# Patient Record
Sex: FEMALE | Race: BLACK OR AFRICAN AMERICAN | NOT HISPANIC OR LATINO | Employment: OTHER | URBAN - METROPOLITAN AREA
[De-identification: names, ages, dates, MRNs, and addresses within clinical notes are randomized per-mention and may not be internally consistent; named-entity substitution may affect disease eponyms.]

---

## 2017-01-28 ENCOUNTER — HOSPITAL ENCOUNTER (EMERGENCY)
Facility: HOSPITAL | Age: 37
Discharge: HOME | End: 2017-01-28
Attending: EMERGENCY MEDICINE
Payer: MEDICAID

## 2017-01-28 VITALS
HEART RATE: 90 BPM | RESPIRATION RATE: 16 BRPM | DIASTOLIC BLOOD PRESSURE: 71 MMHG | OXYGEN SATURATION: 100 % | HEIGHT: 67 IN | TEMPERATURE: 99 F | SYSTOLIC BLOOD PRESSURE: 135 MMHG | BODY MASS INDEX: 31.23 KG/M2 | WEIGHT: 199 LBS

## 2017-01-28 DIAGNOSIS — K04.7 DENTAL ABSCESS: Primary | ICD-10-CM

## 2017-01-28 PROCEDURE — 99283 EMERGENCY DEPT VISIT LOW MDM: CPT

## 2017-01-28 RX ORDER — CLINDAMYCIN HYDROCHLORIDE 150 MG/1
300 CAPSULE ORAL EVERY 8 HOURS
Qty: 42 CAPSULE | Refills: 0 | Status: SHIPPED | OUTPATIENT
Start: 2017-01-28 | End: 2017-02-04

## 2017-01-28 RX ORDER — TRAMADOL HYDROCHLORIDE 50 MG/1
50 TABLET ORAL EVERY 6 HOURS PRN
Qty: 12 TABLET | Refills: 0 | Status: SHIPPED | OUTPATIENT
Start: 2017-01-28 | End: 2017-02-07

## 2017-01-28 NOTE — ED AVS SNAPSHOT
[facility] [address]  [place] [address]               Cyndie Tinsley   2017  8:44 PM   ED    Description:  Female : 1980   Department:  [facility]           Your Care was Coordinated By:     Provider Role From To    Roseline Ellington MD Attending Provider 17 --      Reason for Visit     Dental Pain           Diagnoses this Visit        Comments    Dental abscess    -  Primary       ED Disposition     ED Disposition Condition Comment    Discharge             To Do List           Follow-up Information     Follow up with dentist, see referral sheet.       These Medications        Disp Refills Start End    clindamycin (CLEOCIN) 150 MG capsule 42 capsule 0 2017    Take 2 capsules (300 mg total) by mouth every 8 (eight) hours. - Oral    tramadol (ULTRAM) 50 mg tablet 12 tablet 0 2017    Take 1 tablet (50 mg total) by mouth every 6 (six) hours as needed for Pain. - Oral      [facility]     [facility] Nurse Care Line -  Assistance  Registered nurses in the [facility] provide clinical advisement, health education, appointment booking, and other advisory services.  Call for this free service at [phone].             Medications           Message regarding Medications     Verify the changes and/or additions to your medication regime listed below are the same as discussed with your clinician today.  If any of these changes or additions are incorrect, please notify your healthcare provider.        START taking these NEW medications        Refills    clindamycin (CLEOCIN) 150 MG capsule 0    Sig: Take 2 capsules (300 mg total) by mouth every 8 (eight) hours.    Class: Print    Route: Oral    tramadol (ULTRAM) 50 mg tablet 0    Sig: Take 1 tablet (50 mg total) by mouth every 6 (six) hours as needed for Pain.    Class: Print    Route: Oral           Verify that the below list  "of medications is an accurate representation of the medications you are currently taking.  If none reported, the list may be blank. If incorrect, please contact your healthcare provider. Carry this list with you in case of emergency.           Current Medications     clindamycin (CLEOCIN) 150 MG capsule Take 2 capsules (300 mg total) by mouth every 8 (eight) hours.    naproxen (NAPROSYN) 500 MG tablet Take 1 tablet (500 mg total) by mouth 2 (two) times daily with meals.    norethindrone-ethinyl estradiol-iron (ESTROSTEP FE) 1-20(5)/1-30(7) /1mg-35mcg (9) Tab Take by mouth once daily.    senna-docusate 8.6-50 mg (PERICOLACE) 8.6-50 mg per tablet Take 1 tablet by mouth 2 (two) times daily.    tramadol (ULTRAM) 50 mg tablet Take 1 tablet (50 mg total) by mouth every 6 (six) hours as needed for Pain.           Clinical Reference Information           Your Vitals Were     BP Pulse Temp Resp Height Weight    135/71 90 98.5 °F (36.9 °C) (Oral) 16 5' 7" (1.702 m) 90.3 kg (199 lb)    Last Period SpO2 BMI          01/05/2017 100% 31.17 kg/m2        Allergies as of 1/28/2017        Reactions    Penicillins Anaphylaxis      Immunizations Administered on Date of Encounter - 1/28/2017     None      ED Micro, Lab, POCT     None      ED Imaging Orders     None        Discharge Instructions         Dental Abscess    An abscess is a pocket of pus at the tip of a tooth root in your jaw bone. It is caused by an infection at the root of the tooth. It can cause pain and swelling of the gum, cheek, or jaw. Pain may spread from the tooth to your ear or the area of your jaw on the same side. If the abscess isnt treated, it spreads to the gum near the tooth. This causes more swelling and pain. More serious infections cause your face to swell.  A dental abscess usually starts with a crack or cavity in the tooth. The pain is often made worse by drinking hot or cold fluids, or biting on hard foods.  Home care  Follow these guidelines when " "caring for yourself at home:  · Avoid hot and cold foods and drinks. Your tooth may be sensitive to changes in temperature. Dont chew on the side of the infected tooth.  · If your tooth is chipped or cracked, or if there is a large open cavity, put oil of cloves directly on the tooth to relieve pain. You can buy oil of cloves at drugstores. Some pharmacies carry an over-the-counter "toothache kit." This contains a paste that you can put on the exposed tooth to make it less sensitive.  · Put a cold pack on your jaw over the sore area to help reduce pain.  · You may use acetaminophen or ibuprofen to ease pain, unless another medicine was prescribed. Note: If you have chronic liver or kidney disease, talk with your health care provider before using these medicines. Also talk with your provider if youve had a stomach ulcer or GI bleeding.  · An antibiotic will be prescribed. Take it until finished, even if you are feeling better after a few days.  Follow-up care  Follow up as directed with your dentist or an oral surgeon. Once an infection occurs in a tooth, it will continue to be a problem until the infection is drained. This is done through surgery or a root canal. Or you may need to have your tooth pulled.  When to seek medical advice  Call your health care provider right away if any of these occur:  · Your face becomes more swollen or red  · Your eyelids become swollen  · Pain gets worse or spreads to your neck  · Fever over 100.4º F (38.0º C)  · Unusual drowsiness  · Headache or stiff neck  · Weakness or fainting  · Pus drains from the tooth  · Difficulty swallowing or breathing  © 8872-4120 [employer]. [address] [place] [address], [place] [address]. All rights reserved. This information is not intended as a substitute for professional medical care. Always follow your healthcare professional's instructions.          [facility] Sign-Up     Activating your [facility] account is as easy as 1-2-3!     1) " Visit [email or web address], select Sign Up Now, enter this activation code and your date of birth, then select Next.  [email or web address]  Expires: 3/14/2017  8:57 PM      2) Create a username and password to use when you visit [facility] in the future and select a security question in case you lose your password and select Next.    3) Enter your e-mail address and click Sign Up!    Additional Information  If you have questions, please e-mail [email or web address] or call [phone] to talk to our [facility] staff. Remember, [facility] is NOT to be used for urgent needs. For medical emergencies, dial 911.          [facility] complies with applicable Federal civil rights laws and does not discriminate on the basis of race, color, national origin, age, disability, or sex.        Language Assistance Services     ATTENTION: Language assistance services are available, free of charge. Please call [phone].      ATENCIÓN: Si habla español, tiene a cohen disposición servicios gratuitos de asistencia lingüística. Llame al [phone].     MAYELA Ý: N?u b?n nói Ti?ng Vi?t, có các d?ch v? h? tr? ngôn ng? mi?n phí dành cho b?n. G?i s? [phone].

## 2017-01-29 NOTE — DISCHARGE INSTRUCTIONS
"  Dental Abscess    An abscess is a pocket of pus at the tip of a tooth root in your jaw bone. It is caused by an infection at the root of the tooth. It can cause pain and swelling of the gum, cheek, or jaw. Pain may spread from the tooth to your ear or the area of your jaw on the same side. If the abscess isnt treated, it spreads to the gum near the tooth. This causes more swelling and pain. More serious infections cause your face to swell.  A dental abscess usually starts with a crack or cavity in the tooth. The pain is often made worse by drinking hot or cold fluids, or biting on hard foods.  Home care  Follow these guidelines when caring for yourself at home:  · Avoid hot and cold foods and drinks. Your tooth may be sensitive to changes in temperature. Dont chew on the side of the infected tooth.  · If your tooth is chipped or cracked, or if there is a large open cavity, put oil of cloves directly on the tooth to relieve pain. You can buy oil of cloves at drugstores. Some pharmacies carry an over-the-counter "toothache kit." This contains a paste that you can put on the exposed tooth to make it less sensitive.  · Put a cold pack on your jaw over the sore area to help reduce pain.  · You may use acetaminophen or ibuprofen to ease pain, unless another medicine was prescribed. Note: If you have chronic liver or kidney disease, talk with your health care provider before using these medicines. Also talk with your provider if youve had a stomach ulcer or GI bleeding.  · An antibiotic will be prescribed. Take it until finished, even if you are feeling better after a few days.  Follow-up care  Follow up as directed with your dentist or an oral surgeon. Once an infection occurs in a tooth, it will continue to be a problem until the infection is drained. This is done through surgery or a root canal. Or you may need to have your tooth pulled.  When to seek medical advice  Call your health care provider right away if any " of these occur:  · Your face becomes more swollen or red  · Your eyelids become swollen  · Pain gets worse or spreads to your neck  · Fever over 100.4º F (38.0º C)  · Unusual drowsiness  · Headache or stiff neck  · Weakness or fainting  · Pus drains from the tooth  · Difficulty swallowing or breathing  © 4882-8002 [employer]. [address] [place] [address], [place] [address]. All rights reserved. This information is not intended as a substitute for professional medical care. Always follow your healthcare professional's instructions.

## 2017-01-29 NOTE — ED PROVIDER NOTES
Encounter Date: 1/28/2017       History     Chief Complaint   Patient presents with    Dental Pain     started earlier today     Review of patient's allergies indicates:   Allergen Reactions    Penicillins Anaphylaxis     The history is provided by the patient.    patient resents emergency department with left lower tooth pain tonight.  No swelling to her face.  No fevers.  Past Medical History   Diagnosis Date    Edema     Hypertension      No past medical history pertinent negatives.  Past Surgical History   Procedure Laterality Date    Eye surgery      Breast lumpectomy       History reviewed. No pertinent family history.  Social History   Substance Use Topics    Smoking status: Never Smoker    Smokeless tobacco: Never Used    Alcohol use No     Review of Systems   Constitutional: Negative for fever.   HENT: Negative for sore throat.    Respiratory: Negative for shortness of breath.    Cardiovascular: Negative for chest pain.   Gastrointestinal: Negative for nausea.   Genitourinary: Negative for dysuria.   Musculoskeletal: Negative for back pain.   Skin: Negative for rash.   Neurological: Negative for weakness.   Hematological: Does not bruise/bleed easily.       Physical Exam   Initial Vitals   BP Pulse Resp Temp SpO2   01/28/17 2041 01/28/17 2041 01/28/17 2041 01/28/17 2041 01/28/17 2041   135/71 90 16 98.5 °F (36.9 °C) 100 %     Physical Exam    Nursing note and vitals reviewed.  Constitutional: She appears well-developed and well-nourished.   HENT:   Head: Normocephalic and atraumatic.   Mouth/Throat: Oropharynx is clear and moist.       No gingival abscesses   Neck: Normal range of motion. Neck supple.   Musculoskeletal: She exhibits no edema or tenderness.   Lymphadenopathy:     She has no cervical adenopathy.   Neurological: She is alert and oriented to person, place, and time.   Skin: Skin is warm and dry.   Psychiatric: She has a normal mood and affect. Her behavior is normal. Judgment and  thought content normal.         ED Course   Procedures  Labs Reviewed - No data to display          Medical Decision Making:   ED Management:  Patient with dental abscess.  She'll be started on clindamycin and Ultram for pain and she can be discharged and follow-up with a dentist.                   ED Course     Clinical Impression:   The encounter diagnosis was Dental abscess.          Roseline Ellington MD  01/28/17 3030

## 2017-03-08 ENCOUNTER — HOSPITAL ENCOUNTER (EMERGENCY)
Facility: HOSPITAL | Age: 37
Discharge: HOME | End: 2017-03-09
Attending: EMERGENCY MEDICINE
Payer: MEDICAID

## 2017-03-08 DIAGNOSIS — R60.9 FLUID RETENTION: Primary | ICD-10-CM

## 2017-03-08 PROCEDURE — 99283 EMERGENCY DEPT VISIT LOW MDM: CPT

## 2017-03-08 NOTE — ED AVS SNAPSHOT
[facility] [address]  [place] [address]               Cyndie Tinsley   3/8/2017 11:31 PM   ED    Description:  Female : 1980   Department:  [facility]           Your Care was Coordinated By:     Provider Role From To    Stella De La Garza MD Attending Provider [phone] --      Reason for Visit     Dizziness           Diagnoses this Visit        Comments    Fluid retention    -  Primary       ED Disposition     None           To Do List           Follow-up Information     Follow up with your doctor.    Why:  As needed      [facility]     [facility] Nurse Care Line -  Assistance  Registered nurses in the [facility] provide clinical advisement, health education, appointment booking, and other advisory services.  Call for this free service at [phone].             Medications           Message regarding Medications     Verify the changes and/or additions to your medication regime listed below are the same as discussed with your clinician today.  If any of these changes or additions are incorrect, please notify your healthcare provider.        STOP taking these medications     senna-docusate 8.6-50 mg (PERICOLACE) 8.6-50 mg per tablet Take 1 tablet by mouth 2 (two) times daily.    naproxen (NAPROSYN) 500 MG tablet Take 1 tablet (500 mg total) by mouth 2 (two) times daily with meals.           Verify that the below list of medications is an accurate representation of the medications you are currently taking.  If none reported, the list may be blank. If incorrect, please contact your healthcare provider. Carry this list with you in case of emergency.           Current Medications     norethindrone-ethinyl estradiol-iron (ESTROSTEP FE) 1-20(5)/1-30(7) /1mg-35mcg (9) Tab Take by mouth once daily.           Clinical Reference Information           Your Vitals Were     BP Pulse Temp Resp Height Weight    130/80 (BP  "Location: Left arm, Patient Position: Sitting) 86 97.9 °F (36.6 °C) (Oral) 16 5' 7" (1.702 m) 90.7 kg (200 lb)    SpO2 BMI             100% 31.32 kg/m2         Allergies as of 3/9/2017        Reactions    Penicillins Anaphylaxis      Immunizations Administered on Date of Encounter - 3/9/2017     None      ED Micro, Lab, POCT     None      ED Imaging Orders     None        Discharge Instructions       Avoid salt and salty foods.  Drink plenty of water.        Discharge References/Attachments     LOW-SALT CHOICES (ENGLISH)      [facility] Sign-Up     Activating your [facility] account is as easy as 1-2-3!     1) Visit [email or web address], select Sign Up Now, enter this activation code and your date of birth, then select Next.  [email or web address]  Expires: 3/14/2017  8:57 PM      2) Create a username and password to use when you visit [facility] in the future and select a security question in case you lose your password and select Next.    3) Enter your e-mail address and click Sign Up!    Additional Information  If you have questions, please e-mail [email or web address] or call [phone] to talk to our [facility] staff. Remember, [facility] is NOT to be used for urgent needs. For medical emergencies, dial 911.          [facility] complies with applicable Federal civil rights laws and does not discriminate on the basis of race, color, national origin, age, disability, or sex.        Language Assistance Services     ATTENTION: Language assistance services are available, free of charge. Please call [phone].      ATENCIÓN: Si habla español, tiene a cohen disposición servicios gratuitos de asistencia lingüística. Llame al [phone].     CHÚ Ý: N?u b?n nói Ti?ng Vi?t, có các d?ch v? h? tr? ngôn ng? mi?n phí dành cho b?n. G?i s? [phone].        "

## 2017-03-09 VITALS
HEART RATE: 82 BPM | WEIGHT: 200 LBS | RESPIRATION RATE: 18 BRPM | BODY MASS INDEX: 31.39 KG/M2 | DIASTOLIC BLOOD PRESSURE: 68 MMHG | TEMPERATURE: 98 F | HEIGHT: 67 IN | OXYGEN SATURATION: 98 % | SYSTOLIC BLOOD PRESSURE: 117 MMHG

## 2017-03-09 NOTE — ED NOTES
Pt discharged ambulatory and instructions given; pt is ambulatory and stable and in no distress; no dizziness at discharge

## 2017-03-09 NOTE — ED PROVIDER NOTES
Encounter Date: 3/8/2017       History     Chief Complaint   Patient presents with    Dizziness     Ate seafood earlier and is now feeling dizzy.  Denies chest pain, and shortness of breath     Review of patient's allergies indicates:   Allergen Reactions    Penicillins Anaphylaxis     HPI Comments: 37F presents with dizziness.  She explains it as she ate boiled seafood tonight and her body filled with fluid.  She states whenever she eats seafood her body feels with fluid.  She feels fluid in her head too.  She is not supposed to eat salt.  She is requesting a fluid pill to get rid of the fluid.  No associated trouble swallowing or SOB.  No other complaints.    The history is provided by the patient.     Past Medical History:   Diagnosis Date    Edema     Hypertension      Past Surgical History:   Procedure Laterality Date    BREAST LUMPECTOMY      EYE SURGERY       History reviewed. No pertinent family history.  Social History   Substance Use Topics    Smoking status: Never Smoker    Smokeless tobacco: Never Used    Alcohol use No     Review of Systems   HENT: Negative for trouble swallowing.    Respiratory: Negative for shortness of breath.    Neurological: Negative for dizziness.   All other systems reviewed and are negative.      Physical Exam   Initial Vitals   BP Pulse Resp Temp SpO2   03/08/17 2312 03/08/17 2312 03/08/17 2312 03/08/17 2312 03/08/17 2312   130/80 86 16 97.9 °F (36.6 °C) 100 %     Physical Exam    Nursing note and vitals reviewed.  Constitutional: She appears well-developed and well-nourished. No distress.   HENT:   Head: Normocephalic and atraumatic.   Eyes: Conjunctivae are normal.   Neck: Normal range of motion.   Cardiovascular: Normal rate, regular rhythm and normal heart sounds.   No murmur heard.  Pulmonary/Chest: Breath sounds normal. No stridor. She has no wheezes. She has no rhonchi. She has no rales.   Musculoskeletal: Normal range of motion. She exhibits no edema or  tenderness.   Neurological: She is alert and oriented to person, place, and time.   Skin: Skin is warm and dry.   Psychiatric: She has a normal mood and affect. Her behavior is normal.         ED Course   Procedures  Labs Reviewed - No data to display          Medical Decision Making:   ED Management:  Edema after eating boiled seafood - I see no edema on exam.  No wheezing, no signs of allergic reaction and no anasarca.  I advised pt to avoid salt, including boiled seafood and increase water intake.  I am not prescribing diuretic.                   ED Course     Clinical Impression:   The encounter diagnosis was Fluid retention.          Stella De La Garza MD  03/09/17 0018

## 2017-05-26 ENCOUNTER — HOSPITAL ENCOUNTER (EMERGENCY)
Facility: HOSPITAL | Age: 37
Discharge: HOME | End: 2017-05-26
Attending: EMERGENCY MEDICINE
Payer: MEDICAID

## 2017-05-26 VITALS
DIASTOLIC BLOOD PRESSURE: 83 MMHG | BODY MASS INDEX: 29.82 KG/M2 | RESPIRATION RATE: 18 BRPM | TEMPERATURE: 99 F | OXYGEN SATURATION: 99 % | HEART RATE: 83 BPM | HEIGHT: 67 IN | WEIGHT: 190 LBS | SYSTOLIC BLOOD PRESSURE: 126 MMHG

## 2017-05-26 DIAGNOSIS — R10.9 ABDOMINAL PAIN: ICD-10-CM

## 2017-05-26 DIAGNOSIS — D25.9 UTERINE LEIOMYOMA, UNSPECIFIED LOCATION: ICD-10-CM

## 2017-05-26 DIAGNOSIS — R10.2 SUPRAPUBIC PAIN: Primary | ICD-10-CM

## 2017-05-26 DIAGNOSIS — R10.2 PELVIC PAIN IN FEMALE: ICD-10-CM

## 2017-05-26 LAB
ALBUMIN SERPL BCP-MCNC: 3.7 G/DL
ALP SERPL-CCNC: 69 U/L
ALT SERPL W/O P-5'-P-CCNC: 12 U/L
ANION GAP SERPL CALC-SCNC: 10 MMOL/L
AST SERPL-CCNC: 18 U/L
B-HCG UR QL: NEGATIVE
BACTERIA #/AREA URNS HPF: ABNORMAL /HPF
BACTERIA GENITAL QL WET PREP: ABNORMAL
BASOPHILS # BLD AUTO: 0.03 K/UL
BASOPHILS NFR BLD: 0.4 %
BILIRUB SERPL-MCNC: 0.3 MG/DL
BILIRUB UR QL STRIP: NEGATIVE
BUN SERPL-MCNC: 7 MG/DL
CALCIUM SERPL-MCNC: 9.7 MG/DL
CHLORIDE SERPL-SCNC: 108 MMOL/L
CLARITY UR: ABNORMAL
CLUE CELLS VAG QL WET PREP: ABNORMAL
CO2 SERPL-SCNC: 23 MMOL/L
COLOR UR: YELLOW
CREAT SERPL-MCNC: 0.9 MG/DL
CTP QC/QA: YES
DIFFERENTIAL METHOD: ABNORMAL
EOSINOPHIL # BLD AUTO: 0.2 K/UL
EOSINOPHIL NFR BLD: 2 %
ERYTHROCYTE [DISTWIDTH] IN BLOOD BY AUTOMATED COUNT: 14.3 %
EST. GFR  (AFRICAN AMERICAN): >60 ML/MIN/1.73 M^2
EST. GFR  (NON AFRICAN AMERICAN): >60 ML/MIN/1.73 M^2
FILAMENT FUNGI VAG WET PREP-#/AREA: ABNORMAL
GLUCOSE SERPL-MCNC: 79 MG/DL
GLUCOSE UR QL STRIP: NEGATIVE
HCT VFR BLD AUTO: 36.5 %
HGB BLD-MCNC: 11.9 G/DL
HGB UR QL STRIP: ABNORMAL
KETONES UR QL STRIP: NEGATIVE
LEUKOCYTE ESTERASE UR QL STRIP: NEGATIVE
LIPASE SERPL-CCNC: 46 U/L
LYMPHOCYTES # BLD AUTO: 2.5 K/UL
LYMPHOCYTES NFR BLD: 30.3 %
MCH RBC QN AUTO: 28.7 PG
MCHC RBC AUTO-ENTMCNC: 32.6 %
MCV RBC AUTO: 88 FL
MICROSCOPIC COMMENT: ABNORMAL
MONOCYTES # BLD AUTO: 0.5 K/UL
MONOCYTES NFR BLD: 5.7 %
NEUTROPHILS # BLD AUTO: 5 K/UL
NEUTROPHILS NFR BLD: 61.5 %
NITRITE UR QL STRIP: NEGATIVE
PH UR STRIP: 6 [PH] (ref 5–8)
PLATELET # BLD AUTO: 364 K/UL
PMV BLD AUTO: 9.7 FL
POTASSIUM SERPL-SCNC: 3.9 MMOL/L
PROT SERPL-MCNC: 7.4 G/DL
PROT UR QL STRIP: NEGATIVE
RBC # BLD AUTO: 4.15 M/UL
RBC #/AREA URNS HPF: 5 /HPF (ref 0–4)
SODIUM SERPL-SCNC: 141 MMOL/L
SP GR UR STRIP: >=1.03 (ref 1–1.03)
SPECIMEN SOURCE: ABNORMAL
T VAGINALIS GENITAL QL WET PREP: ABNORMAL
URN SPEC COLLECT METH UR: ABNORMAL
UROBILINOGEN UR STRIP-ACNC: NEGATIVE EU/DL
WBC # BLD AUTO: 8.11 K/UL
WBC #/AREA URNS HPF: 3 /HPF (ref 0–5)
WBC #/AREA VAG WET PREP: ABNORMAL
YEAST GENITAL QL WET PREP: ABNORMAL

## 2017-05-26 PROCEDURE — 99285 EMERGENCY DEPT VISIT HI MDM: CPT | Mod: 25

## 2017-05-26 PROCEDURE — 85025 COMPLETE CBC W/AUTO DIFF WBC: CPT

## 2017-05-26 PROCEDURE — 83690 ASSAY OF LIPASE: CPT

## 2017-05-26 PROCEDURE — 96361 HYDRATE IV INFUSION ADD-ON: CPT

## 2017-05-26 PROCEDURE — 25000003 PHARM REV CODE 250: Performed by: NURSE PRACTITIONER

## 2017-05-26 PROCEDURE — 87210 SMEAR WET MOUNT SALINE/INK: CPT

## 2017-05-26 PROCEDURE — 81025 URINE PREGNANCY TEST: CPT | Performed by: NURSE PRACTITIONER

## 2017-05-26 PROCEDURE — 81000 URINALYSIS NONAUTO W/SCOPE: CPT

## 2017-05-26 PROCEDURE — 87591 N.GONORRHOEAE DNA AMP PROB: CPT

## 2017-05-26 PROCEDURE — 63600175 PHARM REV CODE 636 W HCPCS: Performed by: NURSE PRACTITIONER

## 2017-05-26 PROCEDURE — 96374 THER/PROPH/DIAG INJ IV PUSH: CPT

## 2017-05-26 PROCEDURE — 80053 COMPREHEN METABOLIC PANEL: CPT

## 2017-05-26 RX ORDER — KETOROLAC TROMETHAMINE 30 MG/ML
15 INJECTION, SOLUTION INTRAMUSCULAR; INTRAVENOUS
Status: COMPLETED | OUTPATIENT
Start: 2017-05-26 | End: 2017-05-26

## 2017-05-26 RX ORDER — NAPROXEN 500 MG/1
500 TABLET ORAL 2 TIMES DAILY WITH MEALS
Qty: 10 TABLET | Refills: 0 | Status: SHIPPED | OUTPATIENT
Start: 2017-05-26 | End: 2017-10-29

## 2017-05-26 RX ADMIN — KETOROLAC TROMETHAMINE 15 MG: 30 INJECTION, SOLUTION INTRAMUSCULAR at 07:05

## 2017-05-26 RX ADMIN — SODIUM CHLORIDE 1000 ML: 0.9 INJECTION, SOLUTION INTRAVENOUS at 06:05

## 2017-05-26 NOTE — ED PROVIDER NOTES
"Encounter Date: 5/26/2017       History     Chief Complaint   Patient presents with    Abdominal Pain     accompanied by nausea; denies accompanying symtpoms; began on Monday; lower pelvic pain; denies urinary symptoms     Review of patient's allergies indicates:   Allergen Reactions    Penicillins Anaphylaxis     37-year-old female with a past medical history of ectopic pregnancy presents today with lower abdominal pain.  Patient states the pain started on Monday, and has gradually gotten worse.  Patient denies trauma, fever/chills, nausea, vomiting, diarrhea, constipation, dysuria, hematuria, vaginal discharge him and rash.  Patient states upon providing a urine specimen in the ED, she has noticed spotting on the toilet tissue after wiping.  Patient has tried taking multiple medications for her pain, including old prescription Vicodin.  Nothing makes the pain better or worse.  Patient states that she is able to eat and drink as normal.  Patient states the pain feels like cramping.  Last bowel movement today which was normal.  Pt is sexually active with inconsistent condom use.  LMP was beginning of April.  Pt reports PMHx of syphilis, which was treated >5 years ago.       The history is provided by the patient.   Abdominal Pain   The current episode started several days ago. The onset of the illness was gradual. The problem has not changed since onset.The abdominal pain is located in the suprapubic region. The abdominal pain does not radiate. The severity of the abdominal pain is 9/10. The abdominal pain is relieved by nothing. The other symptoms of the illness include nausea and vaginal bleeding (pt just noticed some "spotting" while providing UA sample in ED.). The other symptoms of the illness do not include fever, fatigue, shortness of breath, vomiting, diarrhea, dysuria, hematemesis, hematochezia or vaginal discharge.   Nausea began 2 days ago. The nausea is associated with eating. The nausea is exacerbated " "by food.   Vaginal bleeding was first noticed today. Vaginal bleeding is unchanged since it began. Vaginal bleeding occurred 1 time. Blood was noticed on tissue. The quantity of blood was equivalent to spotting.   The patient has not had a change in bowel habit. Symptoms associated with the illness do not include chills, anorexia, diaphoresis, heartburn, constipation, urgency, hematuria, frequency or back pain. Significant associated medical issues do not include GERD or diabetes.     Past Medical History:   Diagnosis Date    Edema     Hypertension      Past Surgical History:   Procedure Laterality Date    BREAST LUMPECTOMY      EYE SURGERY       History reviewed. No pertinent family history.  Social History   Substance Use Topics    Smoking status: Never Smoker    Smokeless tobacco: Never Used    Alcohol use No     Review of Systems   Constitutional: Negative for chills, diaphoresis, fatigue and fever.   HENT: Negative for congestion and rhinorrhea.    Respiratory: Negative for cough and shortness of breath.    Cardiovascular: Negative for chest pain.   Gastrointestinal: Positive for abdominal pain and nausea. Negative for anorexia, blood in stool, constipation, diarrhea, heartburn, hematemesis, hematochezia and vomiting.   Endocrine: Negative for polyuria.   Genitourinary: Positive for vaginal bleeding (pt just noticed some "spotting" while providing UA sample in ED.). Negative for dysuria, frequency, hematuria, urgency and vaginal discharge.   Musculoskeletal: Negative for back pain.   Skin: Negative for rash.   Allergic/Immunologic: Negative for immunocompromised state.   Neurological: Negative for headaches.   Hematological: Does not bruise/bleed easily.   Psychiatric/Behavioral: Negative for confusion.       Physical Exam     Initial Vitals [05/26/17 1651]   BP Pulse Resp Temp SpO2   137/79 98 14 98.5 °F (36.9 °C) 99 %     Physical Exam    Nursing note and vitals reviewed.  Constitutional: Vital signs " are normal. She appears well-developed and well-nourished. She is active and cooperative. She is easily aroused.  Non-toxic appearance. She does not have a sickly appearance. She does not appear ill. No distress.   HENT:   Head: Normocephalic and atraumatic.   Mouth/Throat: Uvula is midline and oropharynx is clear and moist.   Eyes: Conjunctivae and EOM are normal.   Neck: Normal range of motion. Neck supple.   Cardiovascular: Normal rate, regular rhythm and normal heart sounds.   Pulmonary/Chest: Effort normal and breath sounds normal.   Abdominal: Soft. Normal appearance and bowel sounds are normal. She exhibits distension (mild). There is tenderness in the suprapubic area. There is no rigidity, no rebound, no guarding and no CVA tenderness.   Genitourinary: Vagina normal and uterus normal. Pelvic exam was performed with patient supine. No labial fusion. There is no rash, tenderness, lesion or injury on the right labia. There is no rash, tenderness, lesion or injury on the left labia. Cervix exhibits no motion tenderness, no discharge and no friability. Right adnexum displays no mass, no tenderness and no fullness. Left adnexum displays no mass, no tenderness and no fullness.   Genitourinary Comments: Witnessed by MANI Mccarthy   Lymphadenopathy:        Right: No inguinal adenopathy present.        Left: No inguinal adenopathy present.   Neurological: She is alert, oriented to person, place, and time and easily aroused. She has normal strength. GCS eye subscore is 4. GCS verbal subscore is 5. GCS motor subscore is 6.   Skin: Skin is warm, dry and intact. No abrasion, no bruising and no rash noted.   Psychiatric: She has a normal mood and affect. Her speech is normal and behavior is normal. Judgment and thought content normal. Cognition and memory are normal.         ED Course   Procedures  Labs Reviewed   URINALYSIS - Abnormal; Notable for the following:        Result Value    Appearance, UA Hazy (*)     Specific  Gravity, UA >=1.030 (*)     Occult Blood UA 2+ (*)     All other components within normal limits   VAGINAL SCREEN - Abnormal; Notable for the following:     WBC - Vaginal Screen Rare (*)     Bacteria - Vaginal Screen Few (*)     All other components within normal limits   URINALYSIS MICROSCOPIC - Abnormal; Notable for the following:     RBC, UA 5 (*)     All other components within normal limits   CBC W/ AUTO DIFFERENTIAL - Abnormal; Notable for the following:     Hemoglobin 11.9 (*)     Hematocrit 36.5 (*)     Platelets 364 (*)     All other components within normal limits   POCT URINE PREGNANCY - Normal   C. TRACHOMATIS/N. GONORRHOEAE BY AMP DNA   COMPREHENSIVE METABOLIC PANEL   LIPASE         Imaging Results          US Pelvis Comp with Transvag NON-OB (xpd) (Final result)  Result time 05/26/17 18:44:48   Procedure changed from US Pelvis Limited Non OB     Final result by Delfino Ceballos MD (05/26/17 18:44:48)                 Impression:        *  Uterine fibroid. No acute process.        Electronically signed by: DELFINO CEBALLOS MD  Date:     05/26/17  Time:    18:44              Narrative:    Time of Procedure: 05/26/17 18:18:30  Accession # [id number]    Reason for study: Pain.     Comparison: 8/30/16.    Technique: Pelvic ultrasound was performed using transvaginal and transabdominal approaches.    Findings: The uterus measures 8.7 x 0.7 x 5.0 cm. Uterine parenchyma demonstrates a subserosal fibroid in the posterior uterine body measuring 3.5 x 3.4 x 3.2 cm.. The endometrial stripe is normal in thickness and measures 0.5 cm.    The right ovary is normal in size and measures 2.7 x 1.4 x 3.2 cm. The left ovary is normal in size and measures 2.7 x 1.0 x 2.1 cm. Follicles are seen in both ovaries. Arterial and venous flow are preserved bilaterally with resistive indices of 0.51 on the right and 0.61 on the left. No free fluid is seen.                             X-Ray Abdomen Flat And Erect (Final result)   Result time 05/26/17 18:19:21    Final result by Corey Capps MD (05/26/17 18:19:21)                 Impression:        No evidence of bowel obstruction or perforation.      Electronically signed by: Dr. Corey Capps MD  Date:     05/26/17  Time:    18:19              Narrative:    SUPINE AND UPRIGHT ABDOMEN:      Comparison: None.    Findings:     The bowel gas pattern is non-obstructive. No evidence of free air.   No airspace consolidation at the lung bases.   No acute bony abnormality.                                   Medical Decision Making:   Initial Assessment:   37-year-old female presents today to the ER for evaluation of suprapubic and abdominal pain.  Pain started on Monday and has progressively gotten worse. Patient last ate a cheeseburger before coming to the ER.  She denies fever/chills, urinary symptoms, nausea/vomiting/diarrhea, vaginal bleeding, vaginal discharge, and rash. Pt noticed spotting after wiping in the ED.  LMP was in April.  Patient appears well, nontoxic.  Vital stable.  Abdomen soft with suprapubic tenderness to palpation. No CVA tenderness.  No r/r/g.  Mild distention.  Bowel sounds normoactive.  No signs of trauma.   Differential Diagnosis:   UTI, pregnancy, Ileus, constipation, dehydration, electrolyte derangement, DUB  Clinical Tests:   Lab Tests: Ordered and Reviewed  Radiological Study: Ordered and Reviewed  ED Management:  Labs, IV fluids, US pelvis, Abd/pelvis xray  US reveals fibroid, no other acute changes. UA negative for infection.  Xray without acute changes. Labs unremarkable.  Advised f/u with GYN within 3 days and return to the ED if condition changes, progresses, or if there are concerns.  Pt verbalized understanding, compliance, and agreement with treatment plan.  RX naprosyn.               Attending Attestation:     Physician Attestation Statement for NP/PA:   I discussed this assessment and plan of this patient with the NP/PA, but I did not personally  examine the patient. The face to face encounter was performed by the NP/PA.                  ED Course     Clinical Impression:   The primary encounter diagnosis was Suprapubic pain. Diagnoses of Abdominal pain, Pelvic pain in female, and Uterine leiomyoma, unspecified location were also pertinent to this visit.    Disposition:   Disposition: Discharged  Condition: Stable       СВЕТЛАНА Cameron  05/26/17 1905       Bernice Pisano MD  05/26/17 1939

## 2017-05-26 NOTE — ED TRIAGE NOTES
Pt reports lower abdominal pain with nausea since Monday.  Denies burning upon urination but states has been getting up to pee a lot at night for the past month.  Denies DM, does not check CBG at home.

## 2017-05-27 LAB
C TRACH DNA SPEC QL NAA+PROBE: NOT DETECTED
N GONORRHOEA DNA SPEC QL NAA+PROBE: NOT DETECTED

## 2017-07-18 PROCEDURE — 96372 THER/PROPH/DIAG INJ SC/IM: CPT

## 2017-07-18 PROCEDURE — 99283 EMERGENCY DEPT VISIT LOW MDM: CPT | Mod: 25

## 2017-07-19 ENCOUNTER — HOSPITAL ENCOUNTER (EMERGENCY)
Facility: HOSPITAL | Age: 37
Discharge: HOME | End: 2017-07-19
Attending: EMERGENCY MEDICINE
Payer: MEDICAID

## 2017-07-19 VITALS
DIASTOLIC BLOOD PRESSURE: 86 MMHG | RESPIRATION RATE: 18 BRPM | HEART RATE: 80 BPM | WEIGHT: 195 LBS | OXYGEN SATURATION: 98 % | HEIGHT: 67 IN | TEMPERATURE: 99 F | BODY MASS INDEX: 30.61 KG/M2 | SYSTOLIC BLOOD PRESSURE: 119 MMHG

## 2017-07-19 DIAGNOSIS — K02.9 DENTAL CARIES: Primary | ICD-10-CM

## 2017-07-19 PROCEDURE — 63600175 PHARM REV CODE 636 W HCPCS: Performed by: EMERGENCY MEDICINE

## 2017-07-19 RX ORDER — KETOROLAC TROMETHAMINE 30 MG/ML
60 INJECTION, SOLUTION INTRAMUSCULAR; INTRAVENOUS
Status: COMPLETED | OUTPATIENT
Start: 2017-07-19 | End: 2017-07-19

## 2017-07-19 RX ORDER — NAPROXEN 500 MG/1
500 TABLET ORAL 2 TIMES DAILY PRN
Qty: 30 TABLET | Refills: 0 | Status: SHIPPED | OUTPATIENT
Start: 2017-07-19 | End: 2017-10-29

## 2017-07-19 RX ORDER — CLINDAMYCIN HYDROCHLORIDE 150 MG/1
300 CAPSULE ORAL 4 TIMES DAILY
Qty: 40 CAPSULE | Refills: 0 | Status: SHIPPED | OUTPATIENT
Start: 2017-07-19 | End: 2017-07-29

## 2017-07-19 RX ORDER — HYDROCODONE BITARTRATE AND ACETAMINOPHEN 5; 325 MG/1; MG/1
1 TABLET ORAL EVERY 4 HOURS PRN
Qty: 12 TABLET | Refills: 0 | Status: SHIPPED | OUTPATIENT
Start: 2017-07-19 | End: 2017-10-29

## 2017-07-19 RX ADMIN — KETOROLAC TROMETHAMINE 60 MG: 30 INJECTION, SOLUTION INTRAMUSCULAR at 03:07

## 2017-07-19 NOTE — ED PROVIDER NOTES
Encounter Date: 7/18/2017       History     Chief Complaint   Patient presents with    Oral Pain     pt. reports upper and lower rt. sided gum pain x1 day.      CHIEF COMPLAINT: right lower toothache    HISTORY OF PRESENT ILLNESS: his is a 37-year-old female presents to the emergency department with right lower toothache.  She forces been there for couple of days.  Tonight she just couldn't stand the pain any longer.  No difficulty swallowing.  No difficulty breathing, no fever, chills or sweats.  No significant amount of swelling noted to the jaw.  No other complaints.    REVIEW OF SYSTEMS:  Constitutional: No fever, no chills.  Eyes: No discharge. No pain.  HENT: No nasal drainage. No ear ache. No sore throat.  Cardiovascular: No chest pain, no palpitations.  Respiratory: No cough, no shortness of breath.  Gastrointestinal: No abdominal pain, no vomiting. No diarrhea.  Genitourinary: No hematuria, dysuria, urgency.  Musculoskeletal: No back pain.  Skin: No rashes, no lesions.  Neurological: No headache, no focal weakness.    ALLERGIES reviewed  Family history reviewed  Home medications reviewed  Problem list reviewed    The history is provided by the patient           Review of patient's allergies indicates:   Allergen Reactions    Penicillins Anaphylaxis     Past Medical History:   Diagnosis Date    Edema     Hypertension      Past Surgical History:   Procedure Laterality Date    BREAST LUMPECTOMY      EYE SURGERY       No family history on file.  Social History   Substance Use Topics    Smoking status: Never Smoker    Smokeless tobacco: Never Used    Alcohol use No     Review of Systems   All other systems reviewed and are negative.      Physical Exam     Initial Vitals [07/19/17 0011]   BP Pulse Resp Temp SpO2   123/89 96 20 98.5 °F (36.9 °C) 97 %      MAP       100.33         Physical Exam    Nursing note and vitals reviewed.  Constitutional: She appears well-developed and well-nourished.   HENT:    Head: Normocephalic and atraumatic.   Nose: Nose normal.   Mouth/Throat: Oropharynx is clear and moist.       No significant amount of jaw swelling.   Eyes: Conjunctivae and EOM are normal. Pupils are equal, round, and reactive to light. No scleral icterus.   Neck: Normal range of motion. Neck supple. No JVD present.   Cardiovascular: Normal rate, regular rhythm, normal heart sounds and intact distal pulses. Exam reveals no gallop and no friction rub.    No murmur heard.  Pulmonary/Chest: Breath sounds normal. No stridor. No respiratory distress. She has no wheezes. She exhibits no tenderness.   Abdominal: Soft. Bowel sounds are normal. She exhibits no distension and no mass. There is no tenderness. There is no rebound and no guarding.   Musculoskeletal: Normal range of motion. She exhibits no edema or tenderness.   Back is nontender to palpation.    Neurological: She is alert and oriented to person, place, and time. She has normal strength. No cranial nerve deficit.   Skin: Skin is warm and dry. No rash noted. No pallor.   Psychiatric: She has a normal mood and affect. Thought content normal.         ED Course   Procedures  Labs Reviewed - No data to display          Medical Decision Making:   Differential Diagnosis:   Dental caries, abscess  ED Management:  This is a 37-year-old female presents to the emergency Department today with pain and toothache.  She has significant amount of dental caries in that area. She has no swelling noted to the jaw.  No difficulty breathing.  No signs of any emergent abscess that would need drainage today.  I will place her on symptom control clindamycin for antibiotics and have her follow-up with dentist for further management.  The patient is comfortable with this plan and comfortable going home at this time. After taking into careful account the historical factors and physical exam findings of the patient's presentation today no acute emergent medical condition has been  identified. The patient appears to be low risk for an emergent medical condition and I feel it is safe and appropriate at this time for the patient to be discharged to follow-up as detailed in their discharge instructions for reevaluation and possible continued outpatient workup and management. I have discussed the specifics of the workup with the patient and the patient has verbalized understanding of the details of the workup, the diagnosis, the treatment plan, and the need for outpatient follow-up.  Although the patient has no emergent etiology today this does not preclude the development of an emergent condition so in addition, I have advised the patient that they can return to the ED and/or activate EMS at any time with worsening of their symptoms, change of their symptoms, or with any other medical complaint.  The patient remained comfortable and stable during their visit in the ED.  Discharge and follow-up instructions discussed with the patient who expressed understanding and willingness to comply with my recommendations.     This medical record was prepared using voice dictation software. There may be phonetic errors.                   ED Course     Clinical Impression:   The encounter diagnosis was Dental caries.                           Larissa Klein MD  07/19/17 0301

## 2017-07-19 NOTE — ED NOTES
"Pt requests to not wait the 15 minute shot time. Pt states "My kids are at home by themselves."   "

## 2017-07-19 NOTE — ED NOTES
Pt presents to ED c/o right sided oral pain. Pt reports the pain began this morning. Pt denies seeing a dentist for this problem. Subjective fever, reports chills throughout the day. Reports has been taking Goody's for pain throughout the day.

## 2017-07-19 NOTE — DISCHARGE INSTRUCTIONS
You'll need to see a dentist for definitive care of your dental cavities.  Take your medications as prescribed.  Refer to following resources for dental services. Return to the emergency department if you have increased swelling, your unable to open your jaw, you've difficulty swallowing or breathing or any other concerns. Refer to the additional material provided for further information including when to return to the emergency department.    Dental resources:     [facility]  [phone]    [facility]  8-4p Monday through Friday  [phone]    [facility]  [phone]    [facility] pediatric [facility]  0-6 years [phone]  7013 years [phone]    [facility]   Donated dental services for developmental disability care  [phone]    [facility] dental services  [phone]    [facility]  [address] [place] [address], Monday through Friday except on Wednesdays 8 - 4 PM  [phone]  Over 60 years old and living in [place]  [phone]    [facility]  [address] [facility]  [phone]    [facility] for [facility] patients  [address]  [phone]    [facility]  [phone]

## 2017-10-28 ENCOUNTER — HOSPITAL ENCOUNTER (EMERGENCY)
Facility: HOSPITAL | Age: 37
Discharge: HOME | End: 2017-10-29
Attending: EMERGENCY MEDICINE
Payer: MEDICAID

## 2017-10-28 DIAGNOSIS — S20.229A CONTUSION OF BACK, UNSPECIFIED LATERALITY, INITIAL ENCOUNTER: Primary | ICD-10-CM

## 2017-10-28 PROCEDURE — 99284 EMERGENCY DEPT VISIT MOD MDM: CPT | Mod: 25

## 2017-10-28 PROCEDURE — 81025 URINE PREGNANCY TEST: CPT | Performed by: EMERGENCY MEDICINE

## 2017-10-28 PROCEDURE — 96372 THER/PROPH/DIAG INJ SC/IM: CPT

## 2017-10-28 RX ORDER — KETOROLAC TROMETHAMINE 30 MG/ML
30 INJECTION, SOLUTION INTRAMUSCULAR; INTRAVENOUS
Status: COMPLETED | OUTPATIENT
Start: 2017-10-28 | End: 2017-10-29

## 2017-10-29 VITALS
SYSTOLIC BLOOD PRESSURE: 113 MMHG | BODY MASS INDEX: 29.82 KG/M2 | HEIGHT: 67 IN | HEART RATE: 87 BPM | RESPIRATION RATE: 16 BRPM | OXYGEN SATURATION: 100 % | DIASTOLIC BLOOD PRESSURE: 77 MMHG | TEMPERATURE: 98 F | WEIGHT: 190 LBS

## 2017-10-29 LAB
B-HCG UR QL: NEGATIVE
CTP QC/QA: YES

## 2017-10-29 PROCEDURE — 63600175 PHARM REV CODE 636 W HCPCS: Performed by: EMERGENCY MEDICINE

## 2017-10-29 RX ORDER — IBUPROFEN 600 MG/1
600 TABLET ORAL EVERY 6 HOURS PRN
Qty: 20 TABLET | Refills: 0 | OUTPATIENT
Start: 2017-10-29 | End: 2018-08-22

## 2017-10-29 RX ORDER — METHOCARBAMOL 500 MG/1
1000 TABLET, FILM COATED ORAL 3 TIMES DAILY PRN
Qty: 30 TABLET | Refills: 0 | Status: SHIPPED | OUTPATIENT
Start: 2017-10-29 | End: 2017-11-03

## 2017-10-29 RX ADMIN — KETOROLAC TROMETHAMINE 30 MG: 30 INJECTION, SOLUTION INTRAMUSCULAR at 12:10

## 2017-10-29 NOTE — ED PROVIDER NOTES
Encounter Date: 10/28/2017    SCRIBE #1 NOTE: I, Joey Schneider, am scribing for, and in the presence of,  Rangel Vargas MD. I have scribed the entire note.       History     Chief Complaint   Patient presents with    Fall     reports fell off of a ladder onto a sofe then landed onto back on the ground. states bilateral legs hurt and back hurts. ladder was 2 foot high. Pt ambulatory to triage desk     Time patient was seen by the provider: 11:21 PM      The patient is a 37 y.o. female with hx of: HTN, edema that presents to the ED with a complaint of back pain. She reports falling off a ladder 2-3 rungs high. She then landed on a sofa which flipped over on top of her. The patient reports her legs are painful in addition to the entire back.         The history is provided by the patient.     Review of patient's allergies indicates:   Allergen Reactions    Penicillins Anaphylaxis     Past Medical History:   Diagnosis Date    Edema     Hypertension      Past Surgical History:   Procedure Laterality Date    BREAST LUMPECTOMY      EYE SURGERY       No family history on file.  Social History   Substance Use Topics    Smoking status: Never Smoker    Smokeless tobacco: Never Used    Alcohol use No     Review of Systems   Constitutional: Negative for fever.   HENT: Negative for sore throat.    Respiratory: Negative for shortness of breath.    Cardiovascular: Negative for chest pain.   Gastrointestinal: Negative for nausea.   Genitourinary: Negative for dysuria.   Musculoskeletal: Positive for arthralgias and back pain.   Skin: Negative for rash.   Neurological: Negative for weakness.   Hematological: Does not bruise/bleed easily.       Physical Exam     Initial Vitals [10/28/17 2246]   BP Pulse Resp Temp SpO2   (!) 161/76 80 18 98.1 °F (36.7 °C) 100 %      MAP       104.33         Physical Exam    Nursing note and vitals reviewed.  Constitutional: She appears well-developed.   HENT:   Head: Normocephalic and atraumatic.    Mouth/Throat: Oropharynx is clear and moist.   Eyes: Conjunctivae are normal.   Neck: Neck supple.   Cardiovascular: Normal rate, regular rhythm, normal heart sounds and intact distal pulses. Exam reveals no gallop and no friction rub.    No murmur heard.  Pulmonary/Chest: Breath sounds normal. She has no wheezes. She has no rhonchi. She has no rales.   Abdominal: Soft. She exhibits no distension. There is no tenderness.   Musculoskeletal:   Vague tenderness to her thoracolumbar spine, no cervical spine tenderness, no bony tenderness to the arms or legs   Neurological: She is alert and oriented to person, place, and time. She has normal strength. No sensory deficit.   Gait is normal   Skin: No rash noted. No erythema.   Psychiatric: She has a normal mood and affect.         ED Course   Procedures  Labs Reviewed   POCT URINE PREGNANCY          X-Rays:   Independently Interpreted Readings:   Other Readings:  T and L spine x-ray series    No fracture or discloation    Medical Decision Making:   Initial Assessment:   Will obtain x-ray of spine even though it is unlike she will have a fracture.   ED Management:  X-rays did not indicate any fracture or dislocation. She will be discharged home on muscle relaxers and was advised to take OTC pain medication. The patirnt was advised to follow with PCP as needed or return for new or worsening symptoms.                    ED Course      Clinical Impression:     1. Contusion of back, unspecified laterality, initial encounter          Disposition:   Disposition: Discharged  Condition: Stable       I, Dr. Rangel Vargas, personally performed the services described in this documentation. All medical record entries made by the scribe were at my direction and in my presence.  I have reviewed the chart and agree that the record reflects my personal performance and is accurate and complete. Rangel Vargas MD.  2:32 AM 10/29/2017                 Rangel Vargas MD  10/29/17 0232

## 2017-10-29 NOTE — ED NOTES
Pt to ER with c/o lower back pain and pain to bilateral legs after falling off of step ladder onto sofa.  Pt states that when she fell on the sofa it flipped over and she hurt her back.  Pt noted sitting in chair with legs crossed.  Ambulates in room without difficulty or unsteady gait.

## 2018-06-10 ENCOUNTER — HOSPITAL ENCOUNTER (EMERGENCY)
Facility: HOSPITAL | Age: 38
Discharge: HOME | End: 2018-06-10
Attending: EMERGENCY MEDICINE
Payer: MEDICAID

## 2018-06-10 VITALS
BODY MASS INDEX: 31.39 KG/M2 | OXYGEN SATURATION: 100 % | SYSTOLIC BLOOD PRESSURE: 137 MMHG | HEART RATE: 79 BPM | RESPIRATION RATE: 18 BRPM | WEIGHT: 200 LBS | HEIGHT: 67 IN | DIASTOLIC BLOOD PRESSURE: 87 MMHG | TEMPERATURE: 99 F

## 2018-06-10 DIAGNOSIS — R51.9 NONINTRACTABLE HEADACHE, UNSPECIFIED CHRONICITY PATTERN, UNSPECIFIED HEADACHE TYPE: Primary | ICD-10-CM
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
## 2018-06-10 LAB
B-HCG UR QL: NEGATIVE
CTP QC/QA: YES
POCT GLUCOSE: 80 MG/DL (ref 70–110)

## 2018-06-10 PROCEDURE — 82962 GLUCOSE BLOOD TEST: CPT

## 2018-06-10 PROCEDURE — 99283 EMERGENCY DEPT VISIT LOW MDM: CPT | Mod: 25

## 2018-06-10 PROCEDURE — 63600175 PHARM REV CODE 636 W HCPCS: Performed by: PHYSICIAN ASSISTANT

## 2018-06-10 PROCEDURE — 81025 URINE PREGNANCY TEST: CPT | Performed by: PHYSICIAN ASSISTANT

## 2018-06-10 PROCEDURE — 96372 THER/PROPH/DIAG INJ SC/IM: CPT

## 2018-06-10 RX ORDER — BUTALBITAL, ACETAMINOPHEN AND CAFFEINE 50; 325; 40 MG/1; MG/1; MG/1
1 TABLET ORAL EVERY 4 HOURS PRN
Qty: 10 TABLET | Refills: 0 | Status: SHIPPED | OUTPATIENT
Start: 2018-06-10 | End: 2018-07-10

## 2018-06-10 RX ORDER — KETOROLAC TROMETHAMINE 30 MG/ML
30 INJECTION, SOLUTION INTRAMUSCULAR; INTRAVENOUS
Status: COMPLETED | OUTPATIENT
Start: 2018-06-10 | End: 2018-06-10

## 2018-06-10 RX ORDER — IBUPROFEN 100 MG/5ML
1000 SUSPENSION, ORAL (FINAL DOSE FORM) ORAL
COMMUNITY
End: 2019-02-10

## 2018-06-10 RX ADMIN — KETOROLAC TROMETHAMINE 30 MG: 30 INJECTION, SOLUTION INTRAMUSCULAR at 04:06

## 2018-06-10 NOTE — ED PROVIDER NOTES
Encounter Date: 6/10/2018       History     Chief Complaint   Patient presents with    Headache     intermitent since friday night after unknown insect bite      Cyndie Tinsley, a 38 y.o. female that presents to the ED for evaluation multiple complaints.  Patient states that she was bit by an unknown insect about 3 days ago and since that time has not been feeling well with c/o of intermittent headache, dizziness, feeling flush.  She states that she initially had swelling and itching to the bite site on her right upper arm which has improved with use of benadryl.  Denies any increased redness or drainage from this site.  She's had intermittent frontal headaches since Friday with dizziness, worse when moving from a seated to a standing position.  Denies any N/V/D, neck pain or fever.  Patient states that she was at work [employer] an started feeling hot, her headache worsened.  She took Goodies right before coming to the ED about 15 mins ago.  She does have a history of headaches.  Denies any changes in vision.          The history is provided by the patient.     Review of patient's allergies indicates:   Allergen Reactions    Penicillins Anaphylaxis     Anaphylaxis^     Past Medical History:   Diagnosis Date    Edema     Hypertension      Past Surgical History:   Procedure Laterality Date    BREAST LUMPECTOMY      EYE SURGERY       History reviewed. No pertinent family history.  Social History   Substance Use Topics    Smoking status: Never Smoker    Smokeless tobacco: Never Used    Alcohol use No     Review of Systems   Constitutional: Negative for fever.   Eyes: Negative for visual disturbance.   Respiratory: Negative for shortness of breath.    Cardiovascular: Negative for chest pain.   Gastrointestinal: Negative for nausea and vomiting.   Skin: Negative for color change and wound.   Allergic/Immunologic: Negative for immunocompromised state.   Neurological: Positive for dizziness and headaches. Negative  for weakness.   Psychiatric/Behavioral: Negative for agitation and confusion.   All other systems reviewed and are negative.      Physical Exam     Initial Vitals [06/10/18 1530]   BP Pulse Resp Temp SpO2   135/84 78 20 98.7 °F (37.1 °C) 100 %      MAP       101         Physical Exam    Nursing note and vitals reviewed.  Constitutional: She appears well-developed and well-nourished. She is not diaphoretic. She appears distressed (crying ).   HENT:   Head: Normocephalic and atraumatic.   Right Ear: External ear normal.   Left Ear: External ear normal.   Nose: Nose normal.   Mouth/Throat: Oropharynx is clear and moist.   Eyes: Conjunctivae and EOM are normal.   Neck: Normal range of motion and full passive range of motion without pain. Neck supple.   Cardiovascular: Normal rate, regular rhythm and normal heart sounds. Exam reveals no gallop and no friction rub.    No murmur heard.  Pulmonary/Chest: Breath sounds normal. No respiratory distress. She has no wheezes. She has no rhonchi. She has no rales.   Abdominal: Soft. Bowel sounds are normal. She exhibits no distension. There is no tenderness. There is no rebound and no guarding.   Musculoskeletal: Normal range of motion.   Neurological: She is alert and oriented to person, place, and time.   Skin: Skin is warm and dry. Capillary refill takes less than 2 seconds. No rash noted. No erythema.        Psychiatric: She has a normal mood and affect. Thought content normal.         ED Course   Procedures  Labs Reviewed   POCT URINE PREGNANCY   POCT GLUCOSE   POCT GLUCOSE MONITORING CONTINUOUS          No orders to display        Medical Decision Making:   Initial Assessment:   Dizziness, headache, insect bite   Differential Diagnosis:   Tension headache, migraine, cluster headache, sinus headache      ED Management:  Feel the patient's headache is benign.  The headache improved with toradol.  Upon reassessment of patient she is resting comfortably and looking on her phone.  " No neck stiffness, vision changes, fever, rash, meningismus/neck stiffness to suggest pseudotumor cerebri or meningitis.  No pain over temporal arteries or vision changes/loss to suggest temporal arteritis.  No "thunderclap onset" or neck stiffness to suggest spontaneous SAH/ICH.  No lancinated pain to eyes with tearing to suggest cluster headache.  No sinus pressure or nasal congestion to suggest sinus headache.  Patient's headache is not in a "band like" distrubution to suggest tension headache.               Attending Attestation:     Physician Attestation Statement for NP/PA:   I discussed this assessment and plan of this patient with the NP/PA, but I did not personally examine the patient. The face to face encounter was performed by the NP/PA.    Other NP/PA Attestation Additions:    History of Present Illness: 38F with frontal HA; recent mosquito bite    Medical Decision Making: No signs of abscess on exam.  No fever or meningeal signs.  Pt do s not look toxic.  Treat HA with NSAIDs.                    Clinical Impression:   The primary encounter diagnosis was Nonintractable headache, unspecified chronicity pattern, unspecified headache type. A diagnosis of Insect bite, initial encounter was also pertinent to this visit.                             Yessi Hameed PA-C  06/10/18 9890       Stella De La Garza MD  06/22/18 0324    "

## 2018-06-10 NOTE — ED NOTES
"Pt c/o generalized malaise, intermitt headaches, hot flashes, sore throat and dizziness since bug bit a few days ago. Denies any health hx besides "I hold onto fluid" denies any change in vision, denies numbness/tingling, endorses dizziness when standing. Gait steady to ED room. Will continue to monitor pt.   "

## 2018-06-10 NOTE — ED NOTES
APPEARANCE: Alert, oriented and in no acute distress.  CARDIAC: Normal rate and rhythm, no murmur heard.   PERIPHERAL VASCULAR: peripheral pulses present. Normal cap refill. No edema. Warm to touch.    RESPIRATORY:Normal rate and effort, breath sounds clear bilaterally throughout chest. Respirations are equal and unlabored no obvious signs of distress.  GASTRO: soft, bowel sounds normal, no tenderness, no abdominal distention.  MUSC: Full ROM. No bony tenderness or soft tissue tenderness. No obvious deformity.  SKIN: Skin is warm and dry, normal skin turgor, mucous membranes moist.  NEURO: 5/5 strength major flexors/extensors bilaterally. Sensory intact to light touch bilaterally. [place] coma scale: eyes open spontaneously-4, oriented & converses-5, obeys commands-6. No neurological abnormalities. Headache and dizziness   MENTAL STATUS: awake, alert and aware of environment. Pt tearful when talking to provider   EYE: PERRL, both eyes: pupils brisk and reactive to light. Normal size.  ENT: EARS: no obvious drainage. NOSE: no active bleeding. Sore throat

## 2018-07-17 ENCOUNTER — HOSPITAL ENCOUNTER (EMERGENCY)
Facility: HOSPITAL | Age: 38
Discharge: HOME | End: 2018-07-17
Attending: EMERGENCY MEDICINE
Payer: MEDICAID

## 2018-07-17 VITALS
RESPIRATION RATE: 20 BRPM | OXYGEN SATURATION: 98 % | BODY MASS INDEX: 31.32 KG/M2 | HEART RATE: 88 BPM | DIASTOLIC BLOOD PRESSURE: 94 MMHG | WEIGHT: 200 LBS | TEMPERATURE: 99 F | SYSTOLIC BLOOD PRESSURE: 161 MMHG

## 2018-07-17 DIAGNOSIS — K02.9 DENTAL CARIES: Primary | ICD-10-CM

## 2018-07-17 PROCEDURE — 96372 THER/PROPH/DIAG INJ SC/IM: CPT

## 2018-07-17 PROCEDURE — 99283 EMERGENCY DEPT VISIT LOW MDM: CPT | Mod: 25

## 2018-07-17 PROCEDURE — 63600175 PHARM REV CODE 636 W HCPCS: Performed by: EMERGENCY MEDICINE

## 2018-07-17 RX ORDER — LIDOCAINE HYDROCHLORIDE 20 MG/ML
JELLY TOPICAL
Qty: 100 ML | Refills: 0 | Status: SHIPPED | OUTPATIENT
Start: 2018-07-17 | End: 2019-02-10

## 2018-07-17 RX ORDER — CLINDAMYCIN HYDROCHLORIDE 300 MG/1
300 CAPSULE ORAL 4 TIMES DAILY
Qty: 20 CAPSULE | Refills: 0 | Status: SHIPPED | OUTPATIENT
Start: 2018-07-17 | End: 2018-07-22

## 2018-07-17 RX ORDER — KETOROLAC TROMETHAMINE 30 MG/ML
30 INJECTION, SOLUTION INTRAMUSCULAR; INTRAVENOUS
Status: COMPLETED | OUTPATIENT
Start: 2018-07-17 | End: 2018-07-17

## 2018-07-17 RX ORDER — NAPROXEN 500 MG/1
500 TABLET ORAL 2 TIMES DAILY WITH MEALS
Qty: 30 TABLET | Refills: 0 | Status: SHIPPED | OUTPATIENT
Start: 2018-07-17 | End: 2019-02-10

## 2018-07-17 RX ORDER — HYDROCODONE BITARTRATE AND ACETAMINOPHEN 5; 325 MG/1; MG/1
1 TABLET ORAL EVERY 4 HOURS PRN
Qty: 18 TABLET | Refills: 0 | Status: SHIPPED | OUTPATIENT
Start: 2018-07-17 | End: 2019-02-10

## 2018-07-17 RX ADMIN — KETOROLAC TROMETHAMINE 30 MG: 30 INJECTION, SOLUTION INTRAMUSCULAR at 07:07

## 2018-07-18 NOTE — ED PROVIDER NOTES
Encounter Date: 7/17/2018    SCRIBE #1 NOTE: I, Nessa Fletcher, am scribing for, and in the presence of,  Dr. Cook. I have scribed the entire note.       History     Chief Complaint   Patient presents with    Otalgia     pt to triage ambulatory and reports left sided denatl pain and ear pain while at work today; pt took nothing; no injury reported    Dental Pain     Time seen by the provider: 7:41 PM    This is a 38 y.o. female with a past medical history of Edema and Hypertension who presents to the Emergency Department with dental pain today. Symptoms are associated with ear pain. Pt denies fever. She reports no alleviating or exacerbating factors. Patient has no prior history of similar symptoms. She has an appointment to see her dentist next week. Pt has a past surgical history that includes Eye surgery and Breast lumpectomy.        The history is provided by the patient.     Review of patient's allergies indicates:   Allergen Reactions    Penicillins Anaphylaxis     Anaphylaxis^     Past Medical History:   Diagnosis Date    Edema     Hypertension      Past Surgical History:   Procedure Laterality Date    BREAST LUMPECTOMY      EYE SURGERY       No family history on file.  Social History   Substance Use Topics    Smoking status: Never Smoker    Smokeless tobacco: Never Used    Alcohol use No     Review of Systems   Constitutional: Negative for activity change, appetite change, chills, diaphoresis, fatigue and fever.   HENT: Positive for dental problem and ear pain. Negative for sore throat.    Respiratory: Negative for cough, chest tightness and shortness of breath.    Cardiovascular: Negative for chest pain and palpitations.   Gastrointestinal: Negative for abdominal distention, abdominal pain, diarrhea, nausea and vomiting.   Endocrine: Negative for polydipsia and polyphagia.   Genitourinary: Negative for difficulty urinating, dysuria and flank pain.   Musculoskeletal: Negative for arthralgias.    Skin: Negative for pallor and rash.   Neurological: Negative for dizziness and headaches.   Psychiatric/Behavioral: Negative for confusion.   All other systems reviewed and are negative.      Physical Exam     Initial Vitals [07/17/18 1917]   BP Pulse Resp Temp SpO2   (!) 161/94 88 20 99.2 °F (37.3 °C) 98 %      MAP       --         Physical Exam    Nursing note and vitals reviewed.  Constitutional: She appears well-developed and well-nourished. She is not diaphoretic. No distress.   HENT:   Head: Atraumatic.   Mouth/Throat: Dental caries present. No dental abscesses.   Extensive dental caries without abscess.   Eyes: Conjunctivae and EOM are normal. Pupils are equal, round, and reactive to light. No scleral icterus.   Neck: Normal range of motion. Neck supple.   Cardiovascular: Normal rate, regular rhythm and normal heart sounds.   Pulmonary/Chest: Breath sounds normal. No respiratory distress.   Abdominal: Soft. Bowel sounds are normal. There is no tenderness.   Musculoskeletal: Normal range of motion. She exhibits no edema or tenderness.   Neurological: She is alert and oriented to person, place, and time.   Skin: Skin is warm and dry. No rash noted. No erythema.   Psychiatric: She has a normal mood and affect. Her behavior is normal.         ED Course   Procedures  Labs Reviewed - No data to display       Imaging Results    None          Medical Decision Making:   ED Management:  Pt has extensive dental caries. appt with dental next week. Nguyễn blakely pain meds              Attending Attestation:           Physician Attestation for Scribe:  Physician Attestation Statement for Scribe #1: I, Vern Cook, reviewed documentation, as scribed by Nessa Whalen in my presence, and it is both accurate and complete.                    Clinical Impression:     1. Dental caries            Disposition:   Disposition: Discharged  Condition: Stable                        Vern Cook MD  07/17/18 2016

## 2018-07-18 NOTE — ED NOTES
Assumed acre of a 38 year old female complain of left ear pain and tooth pan that began around 1 pm today

## 2018-08-21 ENCOUNTER — HOSPITAL ENCOUNTER (EMERGENCY)
Facility: HOSPITAL | Age: 38
Discharge: HOME | End: 2018-08-22
Attending: EMERGENCY MEDICINE
Payer: MEDICAID
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
## 2018-08-21 DIAGNOSIS — T07.XXXA MULTIPLE CONTUSIONS: ICD-10-CM

## 2018-08-21 DIAGNOSIS — T14.90XA TRAUMA: ICD-10-CM

## 2018-08-21 LAB
B-HCG UR QL: NEGATIVE
CTP QC/QA: YES

## 2018-08-21 PROCEDURE — 25000003 PHARM REV CODE 250: Performed by: EMERGENCY MEDICINE

## 2018-08-21 PROCEDURE — 99284 EMERGENCY DEPT VISIT MOD MDM: CPT | Mod: 25

## 2018-08-21 PROCEDURE — 81025 URINE PREGNANCY TEST: CPT | Performed by: EMERGENCY MEDICINE

## 2018-08-21 RX ORDER — CYCLOBENZAPRINE HCL 10 MG
10 TABLET ORAL
Status: COMPLETED | OUTPATIENT
Start: 2018-08-21 | End: 2018-08-21

## 2018-08-21 RX ORDER — IBUPROFEN 600 MG/1
600 TABLET ORAL
Status: COMPLETED | OUTPATIENT
Start: 2018-08-21 | End: 2018-08-21

## 2018-08-21 RX ADMIN — CYCLOBENZAPRINE HYDROCHLORIDE 10 MG: 10 TABLET, FILM COATED ORAL at 11:08

## 2018-08-21 RX ADMIN — IBUPROFEN 600 MG: 600 TABLET ORAL at 11:08

## 2018-08-22 VITALS
OXYGEN SATURATION: 98 % | DIASTOLIC BLOOD PRESSURE: 71 MMHG | BODY MASS INDEX: 32.49 KG/M2 | HEIGHT: 67 IN | SYSTOLIC BLOOD PRESSURE: 112 MMHG | TEMPERATURE: 98 F | WEIGHT: 207 LBS | RESPIRATION RATE: 20 BRPM | HEART RATE: 79 BPM

## 2018-08-22 RX ORDER — MELOXICAM 7.5 MG/1
7.5 TABLET ORAL DAILY
Qty: 12 TABLET | Refills: 0 | Status: SHIPPED | OUTPATIENT
Start: 2018-08-22 | End: 2019-02-10

## 2018-08-22 RX ORDER — CYCLOBENZAPRINE HCL 10 MG
10 TABLET ORAL 3 TIMES DAILY PRN
Qty: 12 TABLET | Refills: 0 | Status: SHIPPED | OUTPATIENT
Start: 2018-08-22 | End: 2018-08-27
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
## 2019-02-10 ENCOUNTER — HOSPITAL ENCOUNTER (OUTPATIENT)
Facility: HOSPITAL | Age: 39
Discharge: HOME | End: 2019-02-11
Attending: EMERGENCY MEDICINE | Admitting: INTERNAL MEDICINE

## 2019-02-10 DIAGNOSIS — R55 SYNCOPE: ICD-10-CM

## 2019-02-10 DIAGNOSIS — R10.32 LLQ PAIN: ICD-10-CM

## 2019-02-10 DIAGNOSIS — R55 SYNCOPE AND COLLAPSE: Primary | ICD-10-CM

## 2019-02-10 PROBLEM — F41.9 ANXIETY: Status: ACTIVE | Noted: 2019-02-10

## 2019-02-10 PROBLEM — E66.9 OBESITY: Status: ACTIVE | Noted: 2019-02-10

## 2019-02-10 PROBLEM — N39.0 UTI (URINARY TRACT INFECTION): Status: ACTIVE | Noted: 2019-02-10

## 2019-02-10 LAB
ALBUMIN SERPL BCP-MCNC: 3.6 G/DL
ALP SERPL-CCNC: 67 U/L
ALT SERPL W/O P-5'-P-CCNC: 10 U/L
AMPHET+METHAMPHET UR QL: NEGATIVE
ANION GAP SERPL CALC-SCNC: 10 MMOL/L
AST SERPL-CCNC: 28 U/L
B-HCG UR QL: NEGATIVE
BACTERIA #/AREA URNS HPF: ABNORMAL /HPF
BARBITURATES UR QL SCN>200 NG/ML: NEGATIVE
BASOPHILS # BLD AUTO: 0.01 K/UL
BASOPHILS NFR BLD: 0.1 %
BENZODIAZ UR QL SCN>200 NG/ML: NEGATIVE
BILIRUB SERPL-MCNC: 0.5 MG/DL
BILIRUB UR QL STRIP: NEGATIVE
BNP SERPL-MCNC: <10 PG/ML
BUN SERPL-MCNC: 9 MG/DL
BZE UR QL SCN: NEGATIVE
CALCIUM SERPL-MCNC: 9.5 MG/DL
CANNABINOIDS UR QL SCN: NEGATIVE
CHLORIDE SERPL-SCNC: 105 MMOL/L
CLARITY UR: ABNORMAL
CO2 SERPL-SCNC: 24 MMOL/L
COLOR UR: YELLOW
CREAT SERPL-MCNC: 1 MG/DL
CREAT UR-MCNC: 220.3 MG/DL
CTP QC/QA: YES
D DIMER PPP IA.FEU-MCNC: 0.39 MG/L FEU
DIFFERENTIAL METHOD: ABNORMAL
EOSINOPHIL # BLD AUTO: 0.1 K/UL
EOSINOPHIL NFR BLD: 0.8 %
ERYTHROCYTE [DISTWIDTH] IN BLOOD BY AUTOMATED COUNT: 13.2 %
EST. GFR  (AFRICAN AMERICAN): >60 ML/MIN/1.73 M^2
EST. GFR  (NON AFRICAN AMERICAN): >60 ML/MIN/1.73 M^2
GLUCOSE SERPL-MCNC: 118 MG/DL
GLUCOSE UR QL STRIP: NEGATIVE
HCT VFR BLD AUTO: 39.2 %
HGB BLD-MCNC: 12.6 G/DL
HGB UR QL STRIP: ABNORMAL
HYALINE CASTS #/AREA URNS LPF: 0 /LPF
KETONES UR QL STRIP: NEGATIVE
LEUKOCYTE ESTERASE UR QL STRIP: ABNORMAL
LYMPHOCYTES # BLD AUTO: 1.7 K/UL
LYMPHOCYTES NFR BLD: 18.3 %
MCH RBC QN AUTO: 29.9 PG
MCHC RBC AUTO-ENTMCNC: 32.1 G/DL
MCV RBC AUTO: 93 FL
METHADONE UR QL SCN>300 NG/ML: NEGATIVE
MICROSCOPIC COMMENT: ABNORMAL
MONOCYTES # BLD AUTO: 0.7 K/UL
MONOCYTES NFR BLD: 7.5 %
NEUTROPHILS # BLD AUTO: 6.8 K/UL
NEUTROPHILS NFR BLD: 73.1 %
NITRITE UR QL STRIP: NEGATIVE
OPIATES UR QL SCN: NEGATIVE
PCP UR QL SCN>25 NG/ML: NEGATIVE
PH UR STRIP: 7 [PH] (ref 5–8)
PLATELET # BLD AUTO: 304 K/UL
PMV BLD AUTO: 9.7 FL
POCT GLUCOSE: 117 MG/DL (ref 70–110)
POTASSIUM SERPL-SCNC: 3.9 MMOL/L
PROT SERPL-MCNC: 7.5 G/DL
PROT UR QL STRIP: ABNORMAL
RBC # BLD AUTO: 4.21 M/UL
RBC #/AREA URNS HPF: 10 /HPF (ref 0–4)
SODIUM SERPL-SCNC: 139 MMOL/L
SP GR UR STRIP: 1.02 (ref 1–1.03)
TOXICOLOGY INFORMATION: NORMAL
TROPONIN I SERPL DL<=0.01 NG/ML-MCNC: <0.006 NG/ML
TSH SERPL DL<=0.005 MIU/L-ACNC: 0.61 UIU/ML
URN SPEC COLLECT METH UR: ABNORMAL
UROBILINOGEN UR STRIP-ACNC: NEGATIVE EU/DL
WBC # BLD AUTO: 9.25 K/UL
WBC #/AREA URNS HPF: 30 /HPF (ref 0–5)
WBC CLUMPS URNS QL MICRO: ABNORMAL

## 2019-02-10 PROCEDURE — 81025 URINE PREGNANCY TEST: CPT | Performed by: EMERGENCY MEDICINE

## 2019-02-10 PROCEDURE — 82962 GLUCOSE BLOOD TEST: CPT

## 2019-02-10 PROCEDURE — 25000003 PHARM REV CODE 250: Performed by: EMERGENCY MEDICINE

## 2019-02-10 PROCEDURE — 81000 URINALYSIS NONAUTO W/SCOPE: CPT | Mod: 59

## 2019-02-10 PROCEDURE — 93005 ELECTROCARDIOGRAM TRACING: CPT

## 2019-02-10 PROCEDURE — 85025 COMPLETE CBC W/AUTO DIFF WBC: CPT

## 2019-02-10 PROCEDURE — 87077 CULTURE AEROBIC IDENTIFY: CPT

## 2019-02-10 PROCEDURE — 87086 URINE CULTURE/COLONY COUNT: CPT

## 2019-02-10 PROCEDURE — 63600175 PHARM REV CODE 636 W HCPCS: Performed by: EMERGENCY MEDICINE

## 2019-02-10 PROCEDURE — 96361 HYDRATE IV INFUSION ADD-ON: CPT

## 2019-02-10 PROCEDURE — 80307 DRUG TEST PRSMV CHEM ANLYZR: CPT

## 2019-02-10 PROCEDURE — 84443 ASSAY THYROID STIM HORMONE: CPT

## 2019-02-10 PROCEDURE — 87088 URINE BACTERIA CULTURE: CPT

## 2019-02-10 PROCEDURE — 96365 THER/PROPH/DIAG IV INF INIT: CPT

## 2019-02-10 PROCEDURE — 99285 EMERGENCY DEPT VISIT HI MDM: CPT | Mod: 25

## 2019-02-10 PROCEDURE — 80053 COMPREHEN METABOLIC PANEL: CPT

## 2019-02-10 PROCEDURE — 96375 TX/PRO/DX INJ NEW DRUG ADDON: CPT

## 2019-02-10 PROCEDURE — 84484 ASSAY OF TROPONIN QUANT: CPT

## 2019-02-10 PROCEDURE — 87186 SC STD MICRODIL/AGAR DIL: CPT

## 2019-02-10 PROCEDURE — 85379 FIBRIN DEGRADATION QUANT: CPT

## 2019-02-10 PROCEDURE — G0378 HOSPITAL OBSERVATION PER HR: HCPCS

## 2019-02-10 PROCEDURE — 83880 ASSAY OF NATRIURETIC PEPTIDE: CPT

## 2019-02-10 PROCEDURE — 96376 TX/PRO/DX INJ SAME DRUG ADON: CPT

## 2019-02-10 RX ORDER — SODIUM CHLORIDE 0.9 % (FLUSH) 0.9 %
5 SYRINGE (ML) INJECTION
Status: DISCONTINUED | OUTPATIENT
Start: 2019-02-10 | End: 2019-02-11 | Stop reason: HOSPADM

## 2019-02-10 RX ORDER — IBUPROFEN 200 MG
24 TABLET ORAL
Status: DISCONTINUED | OUTPATIENT
Start: 2019-02-10 | End: 2019-02-11 | Stop reason: HOSPADM

## 2019-02-10 RX ORDER — HEPARIN SODIUM 5000 [USP'U]/ML
5000 INJECTION, SOLUTION INTRAVENOUS; SUBCUTANEOUS EVERY 12 HOURS
Status: DISCONTINUED | OUTPATIENT
Start: 2019-02-10 | End: 2019-02-11 | Stop reason: HOSPADM

## 2019-02-10 RX ORDER — SODIUM CHLORIDE, SODIUM LACTATE, POTASSIUM CHLORIDE, CALCIUM CHLORIDE 600; 310; 30; 20 MG/100ML; MG/100ML; MG/100ML; MG/100ML
INJECTION, SOLUTION INTRAVENOUS CONTINUOUS
Status: DISCONTINUED | OUTPATIENT
Start: 2019-02-10 | End: 2019-02-11 | Stop reason: HOSPADM

## 2019-02-10 RX ORDER — ACETAMINOPHEN 325 MG/1
650 TABLET ORAL EVERY 6 HOURS PRN
Status: DISCONTINUED | OUTPATIENT
Start: 2019-02-11 | End: 2019-02-11 | Stop reason: HOSPADM

## 2019-02-10 RX ORDER — IBUPROFEN 200 MG
16 TABLET ORAL
Status: DISCONTINUED | OUTPATIENT
Start: 2019-02-10 | End: 2019-02-11 | Stop reason: HOSPADM

## 2019-02-10 RX ORDER — KETOROLAC TROMETHAMINE 30 MG/ML
15 INJECTION, SOLUTION INTRAMUSCULAR; INTRAVENOUS
Status: COMPLETED | OUTPATIENT
Start: 2019-02-10 | End: 2019-02-10

## 2019-02-10 RX ORDER — GLUCAGON 1 MG
1 KIT INJECTION
Status: DISCONTINUED | OUTPATIENT
Start: 2019-02-10 | End: 2019-02-11 | Stop reason: HOSPADM

## 2019-02-10 RX ORDER — INSULIN ASPART 100 [IU]/ML
0-5 INJECTION, SOLUTION INTRAVENOUS; SUBCUTANEOUS
Status: DISCONTINUED | OUTPATIENT
Start: 2019-02-10 | End: 2019-02-11 | Stop reason: HOSPADM

## 2019-02-10 RX ADMIN — CEFTRIAXONE 1 G: 1 INJECTION, SOLUTION INTRAVENOUS at 08:02

## 2019-02-10 RX ADMIN — SODIUM CHLORIDE 1000 ML: 0.9 INJECTION, SOLUTION INTRAVENOUS at 08:02

## 2019-02-10 RX ADMIN — LORAZEPAM 1 MG: 2 INJECTION INTRAMUSCULAR; INTRAVENOUS at 08:02

## 2019-02-10 RX ADMIN — LORAZEPAM 1 MG: 2 INJECTION INTRAMUSCULAR; INTRAVENOUS at 07:02

## 2019-02-10 RX ADMIN — SODIUM CHLORIDE 1000 ML: 0.9 INJECTION, SOLUTION INTRAVENOUS at 07:02

## 2019-02-10 RX ADMIN — KETOROLAC TROMETHAMINE 15 MG: 30 INJECTION, SOLUTION INTRAMUSCULAR at 08:02

## 2019-02-11 VITALS
DIASTOLIC BLOOD PRESSURE: 77 MMHG | HEIGHT: 67 IN | SYSTOLIC BLOOD PRESSURE: 128 MMHG | BODY MASS INDEX: 32.18 KG/M2 | WEIGHT: 205 LBS | HEART RATE: 99 BPM | OXYGEN SATURATION: 100 % | RESPIRATION RATE: 18 BRPM | TEMPERATURE: 98 F

## 2019-02-11 LAB
ANION GAP SERPL CALC-SCNC: 7 MMOL/L
AORTIC ROOT ANNULUS: 2.83 CM
AV INDEX (PROSTH): 0.84
AV MEAN GRADIENT: 4.34 MMHG
AV PEAK GRADIENT: 7.95 MMHG
AV VALVE AREA: 2.29 CM2
AV VELOCITY RATIO: 0.75
BASOPHILS # BLD AUTO: 0.01 K/UL
BASOPHILS NFR BLD: 0.1 %
BSA FOR ECHO PROCEDURE: 2.1 M2
BUN SERPL-MCNC: 8 MG/DL
CALCIUM SERPL-MCNC: 9.2 MG/DL
CHLORIDE SERPL-SCNC: 108 MMOL/L
CO2 SERPL-SCNC: 25 MMOL/L
CREAT SERPL-MCNC: 0.9 MG/DL
CV ECHO LV RWT: 0.39 CM
DIFFERENTIAL METHOD: ABNORMAL
DOP CALC AO PEAK VEL: 1.41 M/S
DOP CALC AO VTI: 27.25 CM
DOP CALC LVOT AREA: 2.75 CM2
DOP CALC LVOT DIAMETER: 1.87 CM
DOP CALC LVOT PEAK VEL: 1.06 M/S
DOP CALC LVOT STROKE VOLUME: 62.53 CM3
DOP CALCLVOT PEAK VEL VTI: 22.78 CM
E WAVE DECELERATION TIME: 173.51 MSEC
E/A RATIO: 1.53
ECHO LV POSTERIOR WALL: 0.82 CM (ref 0.6–1.1)
EOSINOPHIL # BLD AUTO: 0.1 K/UL
EOSINOPHIL NFR BLD: 0.8 %
ERYTHROCYTE [DISTWIDTH] IN BLOOD BY AUTOMATED COUNT: 13.3 %
EST. GFR  (AFRICAN AMERICAN): >60 ML/MIN/1.73 M^2
EST. GFR  (NON AFRICAN AMERICAN): >60 ML/MIN/1.73 M^2
ESTIMATED AVG GLUCOSE: 114 MG/DL
FRACTIONAL SHORTENING: 29 % (ref 28–44)
GLUCOSE SERPL-MCNC: 113 MG/DL
HBA1C MFR BLD HPLC: 5.6 %
HCT VFR BLD AUTO: 36.1 %
HGB BLD-MCNC: 11.6 G/DL
INTERVENTRICULAR SEPTUM: 0.87 CM (ref 0.6–1.1)
LA MAJOR: 3.83 CM
LA MINOR: 4.09 CM
LA WIDTH: 3.16 CM
LEFT ATRIUM SIZE: 3.01 CM
LEFT ATRIUM VOLUME INDEX: 15.7 ML/M2
LEFT ATRIUM VOLUME: 31.98 CM3
LEFT INTERNAL DIMENSION IN SYSTOLE: 3 CM (ref 2.1–4)
LEFT VENTRICLE DIASTOLIC VOLUME INDEX: 39.62 ML/M2
LEFT VENTRICLE DIASTOLIC VOLUME: 80.95 ML
LEFT VENTRICLE MASS INDEX: 54.4 G/M2
LEFT VENTRICLE SYSTOLIC VOLUME INDEX: 17.2 ML/M2
LEFT VENTRICLE SYSTOLIC VOLUME: 35.11 ML
LEFT VENTRICULAR INTERNAL DIMENSION IN DIASTOLE: 4.25 CM (ref 3.5–6)
LEFT VENTRICULAR MASS: 111.11 G
LV LATERAL E/E' RATIO: 5.63
LYMPHOCYTES # BLD AUTO: 1.7 K/UL
LYMPHOCYTES NFR BLD: 22.8 %
MCH RBC QN AUTO: 30 PG
MCHC RBC AUTO-ENTMCNC: 32.1 G/DL
MCV RBC AUTO: 93 FL
MONOCYTES # BLD AUTO: 0.8 K/UL
MONOCYTES NFR BLD: 9.8 %
MV PEAK A VEL: 0.59 M/S
MV PEAK E VEL: 0.9 M/S
NEUTROPHILS # BLD AUTO: 5.1 K/UL
NEUTROPHILS NFR BLD: 66.4 %
PISA TR MAX VEL: 1.43 M/S
PLATELET # BLD AUTO: 275 K/UL
PMV BLD AUTO: 9.9 FL
POCT GLUCOSE: 138 MG/DL (ref 70–110)
POCT GLUCOSE: 142 MG/DL (ref 70–110)
POTASSIUM SERPL-SCNC: 3.9 MMOL/L
PULM VEIN S/D RATIO: 1.42
PV PEAK D VEL: 0.36 M/S
PV PEAK S VEL: 0.51 M/S
RA MAJOR: 3.64 CM
RA PRESSURE: 3 MMHG
RBC # BLD AUTO: 3.87 M/UL
RIGHT VENTRICULAR END-DIASTOLIC DIMENSION: 2.79 CM
SODIUM SERPL-SCNC: 140 MMOL/L
TDI LATERAL: 0.16
TR MAX PG: 8.18 MMHG
TV REST PULMONARY ARTERY PRESSURE: 11 MMHG
WBC # BLD AUTO: 7.64 K/UL

## 2019-02-11 PROCEDURE — 25000003 PHARM REV CODE 250: Performed by: STUDENT IN AN ORGANIZED HEALTH CARE EDUCATION/TRAINING PROGRAM

## 2019-02-11 PROCEDURE — G0378 HOSPITAL OBSERVATION PER HR: HCPCS

## 2019-02-11 PROCEDURE — 85025 COMPLETE CBC W/AUTO DIFF WBC: CPT

## 2019-02-11 PROCEDURE — 94761 N-INVAS EAR/PLS OXIMETRY MLT: CPT

## 2019-02-11 PROCEDURE — 36415 COLL VENOUS BLD VENIPUNCTURE: CPT

## 2019-02-11 PROCEDURE — 83036 HEMOGLOBIN GLYCOSYLATED A1C: CPT

## 2019-02-11 PROCEDURE — 80048 BASIC METABOLIC PNL TOTAL CA: CPT

## 2019-02-11 RX ORDER — NAPROXEN SODIUM 220 MG
440 TABLET ORAL 2 TIMES DAILY WITH MEALS
Status: ON HOLD | COMMUNITY
End: 2019-02-11 | Stop reason: HOSPADM

## 2019-02-11 RX ADMIN — SODIUM CHLORIDE, SODIUM LACTATE, POTASSIUM CHLORIDE, AND CALCIUM CHLORIDE: .6; .31; .03; .02 INJECTION, SOLUTION INTRAVENOUS at 12:02

## 2019-02-11 RX ADMIN — ACETAMINOPHEN 650 MG: 325 TABLET ORAL at 12:02

## 2019-02-11 RX ADMIN — ACETAMINOPHEN 650 MG: 325 TABLET ORAL at 06:02

## 2019-02-11 RX ADMIN — SODIUM CHLORIDE, SODIUM LACTATE, POTASSIUM CHLORIDE, AND CALCIUM CHLORIDE 1000 ML: .6; .31; .03; .02 INJECTION, SOLUTION INTRAVENOUS at 03:02

## 2019-02-11 NOTE — PLAN OF CARE
Appts made.  No other needs identified.    Future Appointments   Date Time Provider Department Center   2/25/2019  1:00 PM Oralia Barton MD [facility]        02/11/19 0379   Final Note   Assessment Type Final Discharge Note   Anticipated Discharge Disposition Home   Hospital Follow Up  Appt(s) scheduled? Yes   Discharge plans and expectations educations in teach back method with documentation complete? Yes   Right Care Referral Info   Post Acute Recommendation No Care

## 2019-02-11 NOTE — H&P
[facility] Medicine H&P Note     Admitting Team: [facility] [place] Hospitalist Team B  Attending Physician: Azul  Resident: Adán  Intern: Alexei    Date of Admit: 2/10/2019    Chief Complaint     Loss of consciousness x25-30 minutes    Subjective:      History of Present Illness:  Cyndie Tinsley is a 39 y.o.  female with a history of anxiety and ectopic pregnancy who presented to [facility] on 2/10/2019 with a primary complaint of syncope x25-30 minutes just PTA.      For the past week, patient has been experiencing headaches requiring Goody's powder.  Yesterday, patient  Began experiencing dysuria and increasing urinary frequency. Today, she got out the shower, felt weak like she was going to pass out, and her niece witnessed her pass out from standing.  Niece reports she hit her head on a doorknob and a wall and was unconscious for 25-30 minutes.  Patient denies any history of syncope, tongue biting, bowel/bladder incontinence, fever, sore throat, changes in urine smell, hematuria, decreased PO intake, nausea, vomiting, or diarrhea.  In the ED, patient was tachycardic up to 140 and remained tachycardic after receiving 2L NS.  She has a listed allergy to penicillin (anaphylactic reaction), but she received ceftriaxone in the ED without complications.  Patient also experiencing sharp, LLQ abdominal pain recently that feels like when she had her ectopic pregnancy (UPT negative).    Past Medical History:  Anxiety  Ectopic pregnancy    Past Surgical History:  Eye surgery  Breast lumpectomy    Allergies:  Penicillin- difficulty breathing (2007)    Home Medications:  Prior to Admission medications    Medication Sig Start Date End Date Taking? Authorizing Provider   ascorbic acid, vitamin C, (VITAMIN C) 1000 MG tablet Take 1,000 mg by mouth.  2/10/19  Historical Provider, MD   HYDROcodone-acetaminophen (NORCO) 5-325 mg per tablet Take 1 tablet by mouth every 4 (four) hours as  "needed. 7/17/18 2/10/19  Vern Cook MD   lidocaine HCL 2% (XYLOCAINE) 2 % jelly Apply topically every 2 (two) hours as needed. 7/17/18 2/10/19  Vern Cook MD   meloxicam (MOBIC) 7.5 MG tablet Take 1 tablet (7.5 mg total) by mouth once daily. 8/22/18 2/10/19  Roseline Ellington MD   naproxen (NAPROSYN) 500 MG tablet Take 1 tablet (500 mg total) by mouth 2 (two) times daily with meals. 7/17/18 2/10/19  Vern Cook MD   VITAMIN B COMPLEX ORAL Take 1 tablet by mouth.  2/10/19  Historical Provider, MD       Family History:  Mother- breast cancer @50  Father- heart trouble  Sister- ovarian cancer    Social History:  Tobacco- denies  Alcohol- denies  Illicits- denies  Works at [employer]    Review of Systems:  Pertinent items are noted in HPI. Patient has been told to watch her fluid intake due to intermittent leg swelling but denies a history of heart failure.  All other systems are reviewed and are negative.    Health Maintaince :   Primary Care Physician: Prosper Castle (doesn't remember name)    Immunizations:   TDap is UTD   Flu not UTD  Pna not UTD    Cancer Screening:  PAP: UTD (2018)  MMG: UTD (2018  Colonoscopy: UTD (not yet 50)     Objective:   Last 24 Hour Vital Signs:  BP  Min: 118/74  Max: 138/63  Temp  Av.2 °F (37.9 °C)  Min: 100.2 °F (37.9 °C)  Max: 100.2 °F (37.9 °C)  Pulse  Av.1  Min: 109  Max: 140  Resp  Av.6  Min: 13  Max: 22  SpO2  Av.9 %  Min: 99 %  Max: 100 %  Height  Av' 7" (170.2 cm)  Min: 5' 7" (170.2 cm)  Max: 5' 7" (170.2 cm)  Weight  Av.3 kg (210 lb)  Min: 95.3 kg (210 lb)  Max: 95.3 kg (210 lb)  Body mass index is 32.89 kg/m².  No intake/output data recorded.    Physical Examination:  Gen: AAOx3, NAD, sleepy, responds to voice  HEENT: head normocephalic, atraumatic; PERRL; EOMI; trachea midline  Cardiac: regular rate and rhythm; no murmurs, rubs, or gallops  Resp: clear to auscultation bilaterally; no wheezing; normal work of breathing  GI: " abdomen soft, non-tender, non-distended; normoactive bowel sounds, no hepatosplenomegaly  Extrem: no clubbing or swelling noted  Skin: no rashes or bruises noted  Neuro: AAOx3, CN III-VII, IX-XII intact; strength 5/5 in all extremities; sensation intact to light touch in all extremities       Laboratory:  Most Recent Data:  CBC:   Lab Results   Component Value Date    WBC 9.25 02/10/2019    HGB 12.6 02/10/2019    HCT 39.2 02/10/2019     02/10/2019    MCV 93 02/10/2019    RDW 13.2 02/10/2019     BMP:   Lab Results   Component Value Date     02/10/2019    K 3.9 02/10/2019     02/10/2019    CO2 24 02/10/2019    BUN 9 02/10/2019    CREATININE 1.0 02/10/2019     (H) 02/10/2019    CALCIUM 9.5 02/10/2019    MG 2.0 09/11/2013     LFTs:   Lab Results   Component Value Date    PROT 7.5 02/10/2019    ALBUMIN 3.6 02/10/2019    BILITOT 0.5 02/10/2019    AST 28 02/10/2019    ALKPHOS 67 02/10/2019    ALT 10 02/10/2019     Coags:   Lab Results   Component Value Date    INR 1.1 09/11/2013     FLP: No results found for: CHOL, HDL, LDLCALC, TRIG, CHOLHDL  DM:   Lab Results   Component Value Date    CREATININE 1.0 02/10/2019     Thyroid:   Lab Results   Component Value Date    TSH 0.609 02/10/2019     Anemia: No results found for: IRON, TIBC, FERRITIN, [id number], FOLATE  Cardiac:   Lab Results   Component Value Date    TROPONINI <0.006 02/10/2019    BNP <10 02/10/2019     Urinalysis:   Lab Results   Component Value Date    COLORU Yellow 02/10/2019    SPECGRAV 1.020 02/10/2019    NITRITE Negative 02/10/2019    KETONESU Negative 02/10/2019    UROBILINOGEN Negative 02/10/2019    WBCUA 30 (H) 02/10/2019       Trended Lab Data:  Recent Labs   Lab 02/10/19  1906   WBC 9.25   HGB 12.6   HCT 39.2      MCV 93   RDW 13.2      K 3.9      CO2 24   BUN 9   CREATININE 1.0   *   PROT 7.5   ALBUMIN 3.6   BILITOT 0.5   AST 28   ALKPHOS 67   ALT 10       Trended Cardiac Data:  Recent Labs   Lab  02/10/19  1906   TROPONINI <0.006   BNP <10       Microbiology Data:  Urine culture in process    Other Results:  EKG (my interpretation): sinus tachycardia    Radiology:  Imaging Results          X-Ray Chest AP Portable (Final result)  Result time 02/10/19 21:15:58    Final result by Jona Mora MD (02/10/19 21:15:58)                 Impression:      1. No acute cardiopulmonary process.      Electronically signed by: Jona Mora MD  Date:    02/10/2019  Time:    21:15             Narrative:    EXAMINATION:  XR CHEST AP PORTABLE    CLINICAL HISTORY:  syncope;    TECHNIQUE:  Single frontal view of the chest was performed.    COMPARISON:  08/21/2018    FINDINGS:  The cardiomediastinal silhouette is not enlarged.  There is no pleural effusion.  The trachea is midline.  The lungs are symmetrically expanded bilaterally without evidence of acute parenchymal process. No large focal consolidation seen.  There is no pneumothorax.  The osseous structures are unremarkable.                               CT Head Without Contrast (Final result)  Result time 02/10/19 20:17:10    Final result by Brunilda Gutierrez MD (02/10/19 20:17:10)                 Impression:      No acute intracranial abnormality.  Redemonstration of calcifications in bilateral basal ganglia in a patient less than 40 years of age.  If warranted, consider workup.    No acute cervical fracture.      Electronically signed by: Brunilda Gutierrez  Date:    02/10/2019  Time:    20:17             Narrative:    EXAMINATION:  CT HEAD WITHOUT CONTRAST; CT CERVICAL SPINE WITHOUT CONTRAST    CLINICAL HISTORY:  Syncope/fainting;; C-spine trauma, NEXUS/CCR positive, low risk;    TECHNIQUE:  Low dose axial images were obtained through the head.  Coronal and sagittal reformations were also performed. Contrast was not administered.    COMPARISON:  09/11/2013    FINDINGS:  CT head without: The ventricles, basal cisterns, cortical sulci within normal limits patient's  stated age.  There is no acute intracranial hemorrhage, territorial infarct, mass effect, or midline shift.  The posterior fossa is degraded by artifact.  There are tiny calcifications seen in bilateral basal ganglia, which were seen on the study of 09/11/2013 in a patient less than 40 years of age.  The visualized paranasal sinuses and mastoid air cells are clear.    CT cervical spine: There is no acute fracture or subluxation.  There is mild straightening of the normal cervical lordosis, which may be due to the presence of a cervical neck collar.  There are mild-to-moderate degenerative changes from C4 through C7.  There is no prevertebral soft tissue swelling.                               CT Cervical Spine Without Contrast (Final result)  Result time 02/10/19 20:17:10    Final result by Brunilda Gutierrez MD (02/10/19 20:17:10)                 Impression:      No acute intracranial abnormality.  Redemonstration of calcifications in bilateral basal ganglia in a patient less than 40 years of age.  If warranted, consider workup.    No acute cervical fracture.      Electronically signed by: Brunilda Gutierrez  Date:    02/10/2019  Time:    20:17             Narrative:    EXAMINATION:  CT HEAD WITHOUT CONTRAST; CT CERVICAL SPINE WITHOUT CONTRAST    CLINICAL HISTORY:  Syncope/fainting;; C-spine trauma, NEXUS/CCR positive, low risk;    TECHNIQUE:  Low dose axial images were obtained through the head.  Coronal and sagittal reformations were also performed. Contrast was not administered.    COMPARISON:  09/11/2013    FINDINGS:  CT head without: The ventricles, basal cisterns, cortical sulci within normal limits patient's stated age.  There is no acute intracranial hemorrhage, territorial infarct, mass effect, or midline shift.  The posterior fossa is degraded by artifact.  There are tiny calcifications seen in bilateral basal ganglia, which were seen on the study of 09/11/2013 in a patient less than 40 years of age.  The  "visualized paranasal sinuses and mastoid air cells are clear.    CT cervical spine: There is no acute fracture or subluxation.  There is mild straightening of the normal cervical lordosis, which may be due to the presence of a cervical neck collar.  There are mild-to-moderate degenerative changes from C4 through C7.  There is no prevertebral soft tissue swelling.                                 Assessment:     Cyndie Tinsley is a 39 y.o. 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        Plan:     Syncope  -Lost consciousness for 25-30 minutes after feeling weak getting out of the shower; no history of syncope  -In the ED, tachycardic up to 140, remained tachycardic after 2L NS  -Has been told to "watch fluid intake" due to leg swelling, but denies history of heart failure  -CT head, C spine- no acute intracranial abnormality; calcifications in bilateral basal ganglia  -Follow up TTE, TSH    Urinary tract infection  -1 day history of dysuria, increasing urinary frequency  -UA with 30 WBC,10 RBC, 1+ leukocyte esterase, nitrite negative  -Urine culture in process  -Given rocephin in the ED without complication (has listed anaphylactic reaction to penicillin in chart)  -Will continue rocephin pending sensitivites    LLQ abdominal pain, history of ectopic pregnancy  -Reports sharp pain feels like ectopic pregnancy  -UPT negative  -Follow up transvaginal ultrasound  -Tylenol q6hr PRN for pain    History of anxiety  -Not taking medications  -No acute issues    Healthcare maintenance  -UTD on Tdap, Pap, Mammogram  -Needs flu, " pneumovax (not interested in receiving them)  -PCP is at [facility] (does not remember name)    Fluids: LR @ 125/hr  Electrolytes: Replace as needed  Nutrition: Regular  PPx: Heparin  Code status: Full    Dispo: Pending urine culture, TTE, TSH; continue antibiotics for UTI    Karan Mcguire MD  [facility] HO-1    [facility] Hospitalist Pager numbers:   [facility] Hospitalist Medicine Team A (Camelia/Adán): [phone]  [facility] Hospitalist Medicine Team B (Onur/Azul):  [phone]

## 2019-02-11 NOTE — NURSING
Pt. Arrived into room 403 via stretcher, AAOx3, denied any present discomforts.  Assessed V/S wnl.  Oriented pt. To room, understanding verbalized.  Will monitor pt. Throughout shift.

## 2019-02-11 NOTE — ED PROVIDER NOTES
Encounter Date: 2/10/2019    SCRIBE #1 NOTE: I, Araseli Hung, am scribing for, and in the presence of,  Dr. Nino. I have scribed the entire note.       History     Chief Complaint   Patient presents with    Loss of Consciousness     felt dizzy while in shower and had syncopal episode when getting out of shower.  Pt fell backwards,  landing on back.  C/o head and neck pain with c-collar applied pta by EMS.  EMS reports CBG on scene 112     Cyndie iTnsley is a 39 y.o. female who  has a past medical history of Edema and Hypertension.    The patient presents to the ED via EMS due to LOC while in the shower this evening where she hit her head. She reports LOC for a few seconds.  Patient reports feeling faint and falling on the way out of the shower. She reports associated symptoms of HA and dysuria. She has not had anything to eat today. She denies suicidal ideation, anxiety, blood in stool, abdominal pain, nausea, vomiting, or diarrhea. She is under stress dealing with the death of a close family friend and endorses depression without suicidal ideation.         The history is provided by a relative and the patient.     Review of patient's allergies indicates:   Allergen Reactions    Penicillins Anaphylaxis     Anaphylaxis^     Past Medical History:   Diagnosis Date    Edema     Hypertension      Past Surgical History:   Procedure Laterality Date    BREAST LUMPECTOMY      EYE SURGERY       History reviewed. No pertinent family history.  Social History     Tobacco Use    Smoking status: Never Smoker    Smokeless tobacco: Never Used   Substance Use Topics    Alcohol use: No    Drug use: No     Review of Systems   Constitutional: Negative for chills and fever.   HENT: Negative for rhinorrhea, sore throat and trouble swallowing.         Pain post head injury    Eyes: Negative for visual disturbance.   Respiratory: Negative for cough and shortness of breath.    Cardiovascular: Negative for chest pain.    Gastrointestinal: Negative for abdominal pain, blood in stool, diarrhea, nausea and vomiting.   Genitourinary: Positive for dysuria. Negative for hematuria.   Musculoskeletal: Negative for back pain.   Skin: Negative for rash.   Neurological: Positive for headaches. Negative for numbness.   Hematological: Negative for adenopathy.   Psychiatric/Behavioral: Negative for suicidal ideas. The patient is not nervous/anxious.        Physical Exam     Initial Vitals [02/10/19 1840]   BP Pulse Resp Temp SpO2   127/83 (!) 115 15 100.2 °F (37.9 °C) 100 %      MAP       --         Physical Exam    Constitutional:  Non-toxic appearance. She appears distressed.   Tearful and anxious    HENT:   Head: Normocephalic and atraumatic.   Eyes: Conjunctivae and EOM are normal. Pupils are equal, round, and reactive to light. Right eye exhibits no nystagmus. Left eye exhibits no nystagmus.   Neck: Neck supple.   Cardiovascular: Regular rhythm, S1 normal and S2 normal.   No murmur heard.  Pulmonary/Chest: Breath sounds normal. No respiratory distress. She has no wheezes. She has no rales.   Abdominal: Soft. She exhibits no distension. There is no tenderness.   Neurological: She is alert. No cranial nerve deficit. GCS eye subscore is 4. GCS verbal subscore is 5. GCS motor subscore is 6.   CN 2-12 intact  Finger to nose within normal limits  Equal strength to bilateral upper extremities, SILT  Equal strength to bilateral lower extremities, SILT    No ct-spine or l-spine tenderness    Skin: Skin is warm. No rash noted.   Psychiatric: She has a normal mood and affect. Her behavior is normal.         ED Course   Procedures  Labs Reviewed   CBC W/ AUTO DIFFERENTIAL - Abnormal; Notable for the following components:       Result Value    Gran% 73.1 (*)     All other components within normal limits   COMPREHENSIVE METABOLIC PANEL - Abnormal; Notable for the following components:    Glucose 118 (*)     All other components within normal limits    URINALYSIS, REFLEX TO URINE CULTURE - Abnormal; Notable for the following components:    Appearance, UA Hazy (*)     Protein, UA 1+ (*)     Occult Blood UA 1+ (*)     Leukocytes, UA 1+ (*)     All other components within normal limits    Narrative:     Preferred Collection Type->Urine, Clean Catch   URINALYSIS MICROSCOPIC - Abnormal; Notable for the following components:    RBC, UA 10 (*)     WBC, UA 30 (*)     All other components within normal limits    Narrative:     Preferred Collection Type->Urine, Clean Catch   POCT GLUCOSE - Abnormal; Notable for the following components:    POCT Glucose 117 (*)     All other components within normal limits   CULTURE, URINE   B-TYPE NATRIURETIC PEPTIDE   B-TYPE NATRIURETIC PEPTIDE   TROPONIN I   TROPONIN I   D DIMER, QUANTITATIVE   D DIMER, QUANTITATIVE   DRUG SCREEN PANEL, URINE EMERGENCY   TSH   DRUG SCREEN PANEL, URINE EMERGENCY    Narrative:     Preferred Collection Type->Urine, Clean Catch   TSH   POCT URINE PREGNANCY   POCT GLUCOSE MONITORING CONTINUOUS     8:04 PM  Patient is feeling better. Her heart rate is 100.     EKG Readings: (Independently Interpreted)   Initial Reading: No STEMI. Rhythm: Sinus Tachycardia. Clinical Impression: with PVCs   Rate 110.   Sinus tachycardia.   No ST segment changes  No STEMI       Imaging Results          CT Head Without Contrast (Final result)  Result time 02/10/19 20:17:10    Final result by Brunilda Gutierrez MD (02/10/19 20:17:10)                 Impression:      No acute intracranial abnormality.  Redemonstration of calcifications in bilateral basal ganglia in a patient less than 40 years of age.  If warranted, consider workup.    No acute cervical fracture.      Electronically signed by: Brunilda Gutierrez  Date:    02/10/2019  Time:    20:17             Narrative:    EXAMINATION:  CT HEAD WITHOUT CONTRAST; CT CERVICAL SPINE WITHOUT CONTRAST    CLINICAL HISTORY:  Syncope/fainting;; C-spine trauma, NEXUS/CCR positive, low  risk;    TECHNIQUE:  Low dose axial images were obtained through the head.  Coronal and sagittal reformations were also performed. Contrast was not administered.    COMPARISON:  09/11/2013    FINDINGS:  CT head without: The ventricles, basal cisterns, cortical sulci within normal limits patient's stated age.  There is no acute intracranial hemorrhage, territorial infarct, mass effect, or midline shift.  The posterior fossa is degraded by artifact.  There are tiny calcifications seen in bilateral basal ganglia, which were seen on the study of 09/11/2013 in a patient less than 40 years of age.  The visualized paranasal sinuses and mastoid air cells are clear.    CT cervical spine: There is no acute fracture or subluxation.  There is mild straightening of the normal cervical lordosis, which may be due to the presence of a cervical neck collar.  There are mild-to-moderate degenerative changes from C4 through C7.  There is no prevertebral soft tissue swelling.                               CT Cervical Spine Without Contrast (Final result)  Result time 02/10/19 20:17:10    Final result by Brunilda Gutierrez MD (02/10/19 20:17:10)                 Impression:      No acute intracranial abnormality.  Redemonstration of calcifications in bilateral basal ganglia in a patient less than 40 years of age.  If warranted, consider workup.    No acute cervical fracture.      Electronically signed by: Brunilda Gutierrez  Date:    02/10/2019  Time:    20:17             Narrative:    EXAMINATION:  CT HEAD WITHOUT CONTRAST; CT CERVICAL SPINE WITHOUT CONTRAST    CLINICAL HISTORY:  Syncope/fainting;; C-spine trauma, NEXUS/CCR positive, low risk;    TECHNIQUE:  Low dose axial images were obtained through the head.  Coronal and sagittal reformations were also performed. Contrast was not administered.    COMPARISON:  09/11/2013    FINDINGS:  CT head without: The ventricles, basal cisterns, cortical sulci within normal limits patient's stated  age.  There is no acute intracranial hemorrhage, territorial infarct, mass effect, or midline shift.  The posterior fossa is degraded by artifact.  There are tiny calcifications seen in bilateral basal ganglia, which were seen on the study of 09/11/2013 in a patient less than 40 years of age.  The visualized paranasal sinuses and mastoid air cells are clear.    CT cervical spine: There is no acute fracture or subluxation.  There is mild straightening of the normal cervical lordosis, which may be due to the presence of a cervical neck collar.  There are mild-to-moderate degenerative changes from C4 through C7.  There is no prevertebral soft tissue swelling.                                   Medical Decision Making:   Clinical Tests:   Lab Tests: Ordered and Reviewed  Radiological Study: Ordered and Reviewed  ED Management:  40 yo female s/p syncopal episode. Low grade temperature and tachycardic on physical exam. Neuro exam non focal.   ekg sinus tach with pvcs  UA consistent with UTI  TSH/ D dimer wnl    Patient is tachycardic, tearful. She was given 2L IVF and 2mg ativan rocephin and toradol and remains tachycardic    Given patient's abnormal vital signs in setting of syncope and presumed infection  Will admit patient for observation for improvement of vital signs and to follow culture.    [facility].                       Clinical Impression:     1. Syncope and collapse    2. Syncope            I, Javed Nino,  personally performed the services described in this documentation. All medical record entries made by the scribe were at my direction and in my presence.  I have reviewed the chart and agree that the record reflects my personal performance and is accurate and complete. Javed Nino M.D. 10:13 AM02/11/2019 Scribe attestation                  Javed Nino Jr., MD  02/11/19 1013

## 2019-02-11 NOTE — PLAN OF CARE
Problem: Adult Inpatient Plan of Care  Goal: Plan of Care Review  Reviewed plan of care with pt., understanding  Verbalized.

## 2019-02-11 NOTE — PLAN OF CARE
Problem: Adult Inpatient Plan of Care  Goal: Plan of Care Review  Outcome: Ongoing (interventions implemented as appropriate)  POC discussed with pt. Pt is awake and alert,oriented X4. No distress noted. Denies any pain. Continues to be Sinus Tachycardia on tele with HR in 100's. IVF continuous at 125 ml/hr.Bed in lowest position. Safety/Fall precautions maintained. Call bell in reach. All questions answered. Verbalized understanding.will continue to monitor.

## 2019-02-11 NOTE — ED NOTES
Pt requested and given ice water to drink.  Pt calm, no longer tearful or anxious appearing.  Talking with family member at bedside.

## 2019-02-11 NOTE — NURSING
Pt is awake and alert. Pt continued to be ST on telemetry with HR in the 100's.No ectopy noted.Denies any pain. No distress noted. IV removed from rac,cath tip intact. Tele monitor removed. Discharge instructions given to patient and family.All questions answered. Verbalized understanding.

## 2019-02-11 NOTE — PLAN OF CARE
Problem: Fall Injury Risk  Goal: Absence of Fall and Fall-Related Injury  Outcome: Revised  Fall precautions in progress, bed in lowest position, wheels locked x4, siderails up x2, bed alarm set and audible, call light within reach.  Will monitor pt. Throughout shift.

## 2019-02-11 NOTE — PLAN OF CARE
VN cued into pt's room for introduction. VN informed pt that VN would be working along side bedside nurse and PCT throughout shift. Level of present pain assessed. At present no distress noted. Thoroughly discussed with patient today's plan of care. Discussed with patient High fall risk protocol and interventions that have been initiated and cont be in place for safety. Patient verbalized clear understanding and cooperation using teach back method. Bed alarm presently activated and in use. Will cont to be available to patient and intervene prn.

## 2019-02-11 NOTE — PLAN OF CARE
Pt lives with sister and works at [employer].  She is independent and drives.  No needs identified at this time.  This  put name on white board and explained blue discharge folder to patient. Discharge planning brochure and/or business card given to patient.  Patient verbalized understanding.     02/11/19 1028   Discharge Assessment   Assessment Type Discharge Planning Assessment   Confirmed/corrected address and phone number on facesheet? Yes   Assessment information obtained from? Patient   Communicated expected length of stay with patient/caregiver yes   Prior to hospitilization cognitive status: Alert/Oriented;No Deficits   Prior to hospitalization functional status: Independent   Current cognitive status: Alert/Oriented;No Deficits   Current Functional Status: Independent   Lives With sibling(s)   Able to Return to Prior Arrangements yes   Is patient able to care for self after discharge? Yes   Who are your caregiver(s) and their phone number(s)? [facility] [phone]   Patient's perception of discharge disposition home or selfcare   Readmission Within the Last 30 Days no previous admission in last 30 days   Patient currently being followed by outpatient case management? No   Patient currently receives any other outside agency services? No   Equipment Currently Used at Home none   Do you have any problems affording any of your prescribed medications? No   Is the patient taking medications as prescribed? yes   Does the patient have transportation home? Yes   Transportation Anticipated family or friend will provide   Does the patient receive services at the [facility]? No   Discharge Plan A Home   Discharge Plan B Home;Home with family   DME Needed Upon Discharge  none   Patient/Family in Agreement with Plan yes

## 2019-02-11 NOTE — PROGRESS NOTES
"[facility] Hospital Medicine Progress Note    Primary Team: [facility] Hospitalist Team B  Attending Physician: Azul  Resident: Bud Mccain  Intern: Ira Linda    Subjective:      Patient reports her lightheadedness is getting better.  She still has some left lower quadrant pressure.  She denies any dysuria, chest pain, or SOB.  Telemetry monitoring showed patient in sinus tachycardia most of the night with occasional PVC's.       Objective:     Last 24 Hour Vital Signs:  BP  Min: 102/55  Max: 138/63  Temp  Av.8 °F (37.1 °C)  Min: 98.1 °F (36.7 °C)  Max: 100.2 °F (37.9 °C)  Pulse  Av.3  Min: 93  Max: 140  Resp  Av.7  Min: 13  Max: 22  SpO2  Av.9 %  Min: 99 %  Max: 100 %  Height  Av' 7" (170.2 cm)  Min: 5' 7" (170.2 cm)  Max: 5' 7" (170.2 cm)  Weight  Av.1 kg (207 lb 8 oz)  Min: 93 kg (205 lb)  Max: 95.3 kg (210 lb)  No intake/output data recorded.    Physical Examination:  Gen: AAOx3, NAD  HEENT: head normocephalic, atraumatic  Cardiac: tachycardic; regular rhythm; II/VI holosystolic murmur heard loudest at the right sternal border; no rubs or gallops  Resp: clear to auscultation bilaterally, normal work of breathing  GI: abdomen soft, non-tender, non-distended; normoactive bowel sounds  Extrem: no clubbing or swelling noted  Skin: no rashes or bruises noted  Neuro: AAOx3, moving all extremities without difficulty       Laboratory:  Laboratory Data Reviewed: yes  Pertinent Findings:  H/H 11.6/36.1, MCV 93  TSH 0.609    Microbiology Data Reviewed: yes  Pertinent Findings:  Urine culture in process    Other Results:  EKG (my interpretation): sinus tachycardia    Radiology Data Reviewed: yes  Pertinent Findings:  CT head, C spine- no acute intracranial abnormality; redemonstration of calcifications in bilateral basal ganglia  Cxr- no acute cardiopulmonary process  Transvaginal US ordered  TTE ordered    Current Medications:     Infusions:   lactated ringers 125 mL/hr at 19 0050 " "       Scheduled:   cefTRIAXone 1 g in dextrose 5 % 50 mL  1 g Intravenous Q24H    heparin (porcine)  5,000 Units Subcutaneous Q12H        PRN:  acetaminophen, dextrose 50%, dextrose 50%, glucagon (human recombinant), glucose, glucose, insulin aspart U-100, sodium chloride 0.9%    Antibiotics and Day Number of Therapy:  Ceftriaxone 2/10 - present    Lines and Day Number of Therapy:  PIV    Assessment:     Cyndie Tinsley is a 39 y.o.female with  Patient Active Problem List    Diagnosis Date Noted    UTI (urinary tract infection) 02/10/2019    Syncope and collapse 02/10/2019    Anxiety 02/10/2019    Obesity 02/10/2019    Syncope 02/10/2019    LLQ pain 02/10/2019    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    H/O chlamydia infection 05/22/2014    H/O syphilis 05/22/2014    Infertility, male, post-vasectomy reversal 05/22/2014    Dental caries 09/23/2013    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        Plan:     Syncope  -Lost consciousness for 25-30 minutes after feeling weak getting out of the shower; no history of syncope  -In the ED, tachycardic up to 140, remained tachycardic after 2L NS  -Has been told to "watch fluid intake" due to leg swelling, but denies history of heart failure  -CT head, C spine- no acute intracranial abnormality; calcifications in bilateral basal ganglia  -TSH 0.609  -Follow up TTE     Urinary tract infection  -1 day history of dysuria, increasing urinary frequency  -UA with 30 WBC,10 RBC, 1+ leukocyte esterase, nitrite negative  -Urine culture in process  -Given rocephin in the ED without complication (has listed anaphylactic reaction to penicillin in chart)  -Will continue rocephin pending sensitivites     LLQ abdominal pain, history of ectopic pregnancy  -Reports sharp pain feels like ectopic pregnancy  -UPT negative  -Follow up transvaginal ultrasound  -Tylenol q6hr PRN for pain     History of anxiety  -Not taking medications  -No acute issues     Healthcare " maintenance  -UTD on Tdap, Pap, Mammogram  -Needs flu, pneumovax (not interested in receiving them)  -PCP is at [facility] (does not remember name)     Fluids: LR @ 125/hr  Electrolytes: Replace as needed  Nutrition: Regular  PPx: Heparin  Code status: Full     Dispo: Pending urine culture, TTE, transvaginal U/S; continue antibiotics for UTI    Karan Mcguire MD  [facility] Internal Medicine HO-1    [facility] Medicine Hospitalist Pager numbers:   [facility] Hospitalist Medicine Team A (Camelia/Adán): [phone]  [facility] Hospitalist Medicine Team B (Onur/Azul):  [phone]

## 2019-02-11 NOTE — DISCHARGE INSTRUCTIONS
Syncope, Treating: Prevention (English) View Edit Remove  Dizziness (Vertigo) and Balance Problems: Staying Safe (English) View Edit Remove

## 2019-02-12 NOTE — DISCHARGE SUMMARY
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  female with a history of anxiety and ectopic pregnancy who presented to [facility] on 2/10/2019 with a primary complaint of syncope x25-30 minutes just PTA.       For the past week, patient has been experiencing headaches requiring daily Goody's powder. Yesterday, patient began experiencing dysuria and increasing urinary frequency. Today, she got out the shower, felt weak like she was going to pass out, and her niece witnessed her pass out from standing. Niece reports she hit her head on a doorknob and a wall and was unconscious for 25-30 minutes. Patient denies any history of syncope, tongue biting, bowel/bladder incontinence, fever, sore throat, changes in urine smell, hematuria, decreased PO intake, nausea, vomiting, or diarrhea. In the ED, patient was tachycardic up to 140 and remained tachycardic after receiving 2L NS. She has a listed allergy to penicillin (anaphylactic reaction), but she received ceftriaxone in the ED without complications. Patient also experiencing sharp, LLQ abdominal pain recently that feels like when she had her ectopic pregnancy (UPT negative).    For the full  "HPI please refer to the History & Physical from this admission.    Hospital Course By Problem with Pertinent Findings     Syncope  - Lost consciousness for 25-30 minutes after feeling weak getting out of the shower; no history of syncope or seizure in the past  - In the ED, tachycardic up to 140, remained tachycardic after 2L NS  Given an additional 1L bolus of LR and given continuous fluids while admitted.  - Has been told to "watch fluid intake" due to leg swelling, but denies history of heart failure  - CT head, C spine- no acute intracranial abnormality; calcifications in bilateral basal ganglia  TTE with no evidence of wall motion abnormality, normal EF, normal anatomical structure, and no systolic or diastolic dysfunction.  - TSH 0.609, not concerning for hyper/hypothyroidism. D-Dimer in the ED was negative. No events while on tele concerning for arrythmia.  - Believe likely component of dehydration/orthostasis or vasovagal reaction.     Urinary tract infection  - 1 day history of dysuria, increasing urinary frequency  - UA with 30 WBC,10 RBC, 1+ leukocyte esterase, nitrite negative  Urine culture in process  - Given Rocephin      LLQ abdominal pain, history of ectopic pregnancy  - Reports sharp pain feels like ectopic pregnancy  UPT negative  - Transvaginal US showing 1.8cm simple left ovarian cyst, 4.3 cm probable uterine fibroid  - Speculum and Pelvic Exam was performed, no discharge or cervical wall tenderness.  - Should follow-up with L-OBGYN @ [facility] after discharge. Patient is an established patient and has an appointment in May.     History of anxiety  -Not taking medications  -No acute issues, should further discuss with PCP if she feels that she needs medical management.      Discharge Medications        Medication List      STOP taking these medications    ascorbic acid (vitamin C) 1000 MG tablet  Commonly known as:  VITAMIN C     HYDROcodone-acetaminophen 5-325 mg per tablet  Commonly known as:  " NORCO     lidocaine HCL 2% 2 % jelly  Commonly known as:  XYLOCAINE     meloxicam 7.5 MG tablet  Commonly known as:  MOBIC     naproxen 500 MG tablet  Commonly known as:  NAPROSYN     naproxen sodium 220 MG tablet  Commonly known as:  ANAPROX     VITAMIN B COMPLEX ORAL            Discharge Information:   Diet:  Regular    Physical Activity:  As Tolerated    Instructions:  1. Take all medications as prescribed  2. Keep all follow-up appointments  3. Return to the hospital or call your primary care physicians if any worsening symptoms such as syncope, worsening dysuria/increased urinary frequency, and palpitations occur.    Follow-Up Appointments:  Follow-up with L-OBGYN @ [facility] in May (Patient has a scheduled appointment)  Mick - 2/25/2019 @ 1:00PM    Anjel Roth  [facility] Internal Medicine, [place]

## 2019-02-14 LAB — BACTERIA UR CULT: NORMAL

## 2019-07-05 ENCOUNTER — HOSPITAL ENCOUNTER (EMERGENCY)
Facility: HOSPITAL | Age: 39
Discharge: HOME | End: 2019-07-05
Attending: EMERGENCY MEDICINE
Payer: MEDICAID

## 2019-07-05 VITALS
SYSTOLIC BLOOD PRESSURE: 135 MMHG | WEIGHT: 200 LBS | HEART RATE: 85 BPM | BODY MASS INDEX: 31.39 KG/M2 | DIASTOLIC BLOOD PRESSURE: 84 MMHG | RESPIRATION RATE: 18 BRPM | OXYGEN SATURATION: 100 % | HEIGHT: 67 IN | TEMPERATURE: 99 F

## 2019-07-05 DIAGNOSIS — K04.01 ACUTE PULPITIS: Primary | ICD-10-CM

## 2019-07-05 PROCEDURE — 25000003 PHARM REV CODE 250: Performed by: PHYSICIAN ASSISTANT

## 2019-07-05 PROCEDURE — 99284 EMERGENCY DEPT VISIT MOD MDM: CPT

## 2019-07-05 RX ORDER — KETOROLAC TROMETHAMINE 10 MG/1
10 TABLET, FILM COATED ORAL
Status: COMPLETED | OUTPATIENT
Start: 2019-07-05 | End: 2019-07-05

## 2019-07-05 RX ORDER — KETOROLAC TROMETHAMINE 10 MG/1
10 TABLET, FILM COATED ORAL EVERY 6 HOURS
Qty: 12 TABLET | Refills: 0 | Status: SHIPPED | OUTPATIENT
Start: 2019-07-05 | End: 2019-07-08

## 2019-07-05 RX ORDER — CLINDAMYCIN HYDROCHLORIDE 300 MG/1
300 CAPSULE ORAL 3 TIMES DAILY
Qty: 21 CAPSULE | Refills: 0 | Status: SHIPPED | OUTPATIENT
Start: 2019-07-05 | End: 2019-07-12

## 2019-07-05 RX ORDER — CHLORHEXIDINE GLUCONATE ORAL RINSE 1.2 MG/ML
15 SOLUTION DENTAL 2 TIMES DAILY
Qty: 473 ML | Refills: 0 | Status: SHIPPED | OUTPATIENT
Start: 2019-07-05 | End: 2019-07-19

## 2019-07-05 RX ADMIN — KETOROLAC TROMETHAMINE 10 MG: 10 TABLET, FILM COATED ORAL at 06:07

## 2019-07-05 NOTE — ED TRIAGE NOTES
Pt presents to ED with jaw pain with onset this am. Pt states its a tooth ache but is unsure if it is upper or lower toot pain. Pt states she has not seen the dentist in  1 year. Pt states she did take a goodie with no relief.

## 2019-07-06 NOTE — ED PROVIDER NOTES
Encounter Date: 7/5/2019       History     Chief Complaint   Patient presents with    Jaw Pain     Reports pain to R jaw and into R ear that started yesterday.      Cyndie Tinsley 39 y.o. afebrile female presented to the ED with c/o right-sided dental pain. Patient reports that pain began yesterday.  She denies any recent trauma.  She states that she has known dental caries of the affected area and was told that she needed to have tooth extracted however did not follow up with dentist.  She does state that the pain began gradually yesterday and now radiates to the right ear.  She reports subjective facial swelling.  She states pain is worse with mastication palpation. She denies any fever, chills, taste disturbance, neck pain or difficulty swallowing.  She has tried goodies with little relief in pain.     The history is provided by the patient.     Review of patient's allergies indicates:   Allergen Reactions    Penicillins Anaphylaxis     Anaphylaxis^     Past Medical History:   Diagnosis Date    Edema      Past Surgical History:   Procedure Laterality Date    BREAST LUMPECTOMY      EYE SURGERY       History reviewed. No pertinent family history.  Social History     Tobacco Use    Smoking status: Never Smoker    Smokeless tobacco: Never Used   Substance Use Topics    Alcohol use: No    Drug use: No     Review of Systems   Constitutional: Negative for chills and fever.   HENT: Positive for dental problem, ear pain and facial swelling. Negative for postnasal drip, sinus pressure, sinus pain and sore throat.    Respiratory: Negative for cough.    Gastrointestinal: Negative for nausea and vomiting.   Musculoskeletal: Negative for neck pain and neck stiffness.   Skin: Negative for rash.   Neurological: Negative for dizziness, weakness, light-headedness and headaches.   Hematological: Does not bruise/bleed easily.       Physical Exam     Initial Vitals [07/05/19 1711]   BP Pulse Resp Temp SpO2   135/84 85  18 99.1 °F (37.3 °C) 100 %      MAP       --         Physical Exam    Nursing note and vitals reviewed.  Constitutional: Vital signs are normal. She appears well-developed and well-nourished. She is cooperative.  Non-toxic appearance. She does not appear ill.   Mild distress   HENT:   Head: Normocephalic and atraumatic.   Right Ear: Tympanic membrane normal.   Left Ear: Tympanic membrane normal.   Nose: Nose normal.   Mouth/Throat: Oropharynx is clear and moist. Mucous membranes are not dry. No trismus in the jaw. Dental caries present. No dental abscesses or uvula swelling.       Face with no swelling or trismus. TTP of the circled  Tooth with moderate dental decay noted with no fluctuance, drainage, bleeding, tonsillar, peritonsillar swelling, erythema, sublingual swelling or lymphadenopathy at this time   Eyes: Conjunctivae and lids are normal.   Neck: Neck supple. No neck rigidity.   Cardiovascular: Normal rate.   Pulmonary/Chest: Breath sounds normal. No respiratory distress.   Abdominal: Soft. Normal appearance and bowel sounds are normal. There is no tenderness. There is no rigidity and no guarding.   Musculoskeletal: Normal range of motion.   Neurological: She is alert and oriented to person, place, and time. GCS eye subscore is 4. GCS verbal subscore is 5. GCS motor subscore is 6.   Skin: Skin is warm, dry and intact. No rash noted.   Psychiatric: She has a normal mood and affect. Her speech is normal and behavior is normal. Thought content normal.         ED Course   Procedures  Labs Reviewed - No data to display       Imaging Results    None         Cyndie Tinsley 39 y.o. afebrile female presented to the ED with c/o right-sided dental pain. Patient reports that pain began yesterday.  She denies any recent trauma.  She states that she has known dental caries of the affected area and was told that she needed to have tooth extracted however did not follow up with dentist.  She does state that the pain  began gradually yesterday and now radiates to the right ear.  She reports subjective facial swelling.  She states pain is worse with mastication palpation. She denies any fever, chills, taste disturbance, neck pain or difficulty swallowing.  She has tried goodies with little relief in pain.  ROS positive for dental pain.  Physical exam reveals patient in some distress due to pain but otherwise well appearing. Face with no swelling or trismus. TTP of the circled  Tooth with moderate dental decay noted with no fluctuance, drainage, bleeding, tonsillar, peritonsillar swelling, erythema, sublingual swelling or lymphadenopathy at this time. FROM of the TMJ. TM's and ear canal free of abnormality. FROM of neck and fair ROM of extremities. Lungs clear, heart regular rate and rhythm.    DDX: pain due to dental caries; pulpitis, dental abscess    ED management: patient remains well appearing, afebrile and with no signs of abscess at this time however, as she intends to see dentist we will place on antibiotics for developing pulpitis. Instructed to take motrin and pain medication as needed.    Impression/Plan: The encounter diagnosis was Acute pulpitis. Discharged with Peridex, clindamycin and Toradol. Patient will follow up with Dentist.  Patient cautioned on when to return to ED.  Pt. Understands and agrees with current treatment plan                           Clinical Impression:       ICD-10-CM ICD-9-CM   1. Acute pulpitis K04.01 522.0                                RICHMOND Day  07/05/19 9802

## 2019-08-31 ENCOUNTER — HOSPITAL ENCOUNTER (OUTPATIENT)
Facility: HOSPITAL | Age: 39
Discharge: HOME | End: 2019-09-01
Attending: EMERGENCY MEDICINE | Admitting: FAMILY MEDICINE
Payer: MEDICAID

## 2019-08-31 ENCOUNTER — APPOINTMENT (OUTPATIENT)
Dept: RADIOLOGY | Facility: HOSPITAL | Age: 39
End: 2019-08-31
Attending: NURSE PRACTITIONER
Payer: MEDICAID

## 2019-08-31 DIAGNOSIS — R29.90 STROKE-LIKE SYMPTOMS: Primary | ICD-10-CM

## 2019-08-31 DIAGNOSIS — I63.9 STROKE: ICD-10-CM

## 2019-08-31 PROBLEM — I63.112 CEREBRAL INFARCTION DUE TO EMBOLISM OF LEFT VERTEBRAL ARTERY: Status: ACTIVE | Noted: 2019-08-31

## 2019-08-31 LAB
ALBUMIN SERPL BCP-MCNC: 4.1 G/DL (ref 3.5–5.2)
ALP SERPL-CCNC: 76 U/L (ref 55–135)
ALT SERPL W/O P-5'-P-CCNC: 15 U/L (ref 10–44)
AMPHET+METHAMPHET UR QL: NEGATIVE
ANION GAP SERPL CALC-SCNC: 12 MMOL/L (ref 8–16)
AST SERPL-CCNC: 20 U/L (ref 10–40)
BARBITURATES UR QL SCN>200 NG/ML: NEGATIVE
BASOPHILS # BLD AUTO: 0.02 K/UL (ref 0–0.2)
BASOPHILS NFR BLD: 0.3 % (ref 0–1.9)
BENZODIAZ UR QL SCN>200 NG/ML: NEGATIVE
BILIRUB SERPL-MCNC: 0.5 MG/DL (ref 0.1–1)
BILIRUB UR QL STRIP: NEGATIVE
BUN SERPL-MCNC: 9 MG/DL (ref 6–20)
BZE UR QL SCN: NEGATIVE
CALCIUM SERPL-MCNC: 10 MG/DL (ref 8.7–10.5)
CANNABINOIDS UR QL SCN: NEGATIVE
CHLORIDE SERPL-SCNC: 107 MMOL/L (ref 95–110)
CHOLEST SERPL-MCNC: 182 MG/DL (ref 120–199)
CHOLEST/HDLC SERPL: 4 {RATIO} (ref 2–5)
CLARITY UR: CLEAR
CO2 SERPL-SCNC: 22 MMOL/L (ref 23–29)
COLOR UR: YELLOW
CREAT SERPL-MCNC: 0.8 MG/DL (ref 0.5–1.4)
CREAT SERPL-MCNC: 0.9 MG/DL (ref 0.5–1.4)
CREAT UR-MCNC: 53.8 MG/DL (ref 15–325)
DIFFERENTIAL METHOD: NORMAL
EOSINOPHIL # BLD AUTO: 0.2 K/UL (ref 0–0.5)
EOSINOPHIL NFR BLD: 3.1 % (ref 0–8)
ERYTHROCYTE [DISTWIDTH] IN BLOOD BY AUTOMATED COUNT: 13.3 % (ref 11.5–14.5)
EST. GFR  (AFRICAN AMERICAN): >60 ML/MIN/1.73 M^2
EST. GFR  (NON AFRICAN AMERICAN): >60 ML/MIN/1.73 M^2
ESTIMATED AVG GLUCOSE: 120 MG/DL (ref 68–131)
GLUCOSE SERPL-MCNC: 97 MG/DL (ref 70–110)
GLUCOSE UR QL STRIP: NEGATIVE
HBA1C MFR BLD HPLC: 5.8 % (ref 4–5.6)
HCT VFR BLD AUTO: 40.3 % (ref 37–48.5)
HDLC SERPL-MCNC: 46 MG/DL (ref 40–75)
HDLC SERPL: 25.3 % (ref 20–50)
HGB BLD-MCNC: 13.2 G/DL (ref 12–16)
HGB UR QL STRIP: ABNORMAL
INR PPP: 1.1 (ref 0.8–1.2)
KETONES UR QL STRIP: NEGATIVE
LDLC SERPL CALC-MCNC: 123.4 MG/DL (ref 63–159)
LEUKOCYTE ESTERASE UR QL STRIP: NEGATIVE
LYMPHOCYTES # BLD AUTO: 2.1 K/UL (ref 1–4.8)
LYMPHOCYTES NFR BLD: 32.5 % (ref 18–48)
MCH RBC QN AUTO: 29.9 PG (ref 27–31)
MCHC RBC AUTO-ENTMCNC: 32.8 G/DL (ref 32–36)
MCV RBC AUTO: 91 FL (ref 82–98)
METHADONE UR QL SCN>300 NG/ML: NEGATIVE
MONOCYTES # BLD AUTO: 0.4 K/UL (ref 0.3–1)
MONOCYTES NFR BLD: 5.6 % (ref 4–15)
NEUTROPHILS # BLD AUTO: 3.8 K/UL (ref 1.8–7.7)
NEUTROPHILS NFR BLD: 58.5 % (ref 38–73)
NITRITE UR QL STRIP: NEGATIVE
NONHDLC SERPL-MCNC: 136 MG/DL
OPIATES UR QL SCN: NEGATIVE
PCP UR QL SCN>25 NG/ML: NEGATIVE
PH UR STRIP: 6 [PH] (ref 5–8)
PLATELET # BLD AUTO: 350 K/UL (ref 150–350)
PMV BLD AUTO: 9.6 FL (ref 9.2–12.9)
POC PTINR: 1.2 (ref 0.9–1.2)
POC PTWBT: 14 SEC (ref 9.7–14.3)
POCT GLUCOSE: 96 MG/DL (ref 70–110)
POTASSIUM SERPL-SCNC: 3.7 MMOL/L (ref 3.5–5.1)
PROT SERPL-MCNC: 7.9 G/DL (ref 6–8.4)
PROT UR QL STRIP: NEGATIVE
PROTHROMBIN TIME: 11 SEC (ref 9–12.5)
RBC # BLD AUTO: 4.42 M/UL (ref 4–5.4)
SAMPLE: NORMAL
SAMPLE: NORMAL
SODIUM SERPL-SCNC: 141 MMOL/L (ref 136–145)
SP GR UR STRIP: 1.01 (ref 1–1.03)
TOXICOLOGY INFORMATION: NORMAL
TRIGL SERPL-MCNC: 63 MG/DL (ref 30–150)
TROPONIN I SERPL DL<=0.01 NG/ML-MCNC: <0.006 NG/ML (ref 0–0.03)
TSH SERPL DL<=0.005 MIU/L-ACNC: 0.99 UIU/ML (ref 0.4–4)
URN SPEC COLLECT METH UR: ABNORMAL
UROBILINOGEN UR STRIP-ACNC: NEGATIVE EU/DL
WBC # BLD AUTO: 6.47 K/UL (ref 3.9–12.7)

## 2019-08-31 PROCEDURE — 84443 ASSAY THYROID STIM HORMONE: CPT

## 2019-08-31 PROCEDURE — G0378 HOSPITAL OBSERVATION PER HR: HCPCS

## 2019-08-31 PROCEDURE — 25000003 PHARM REV CODE 250: Performed by: NURSE PRACTITIONER

## 2019-08-31 PROCEDURE — 70544 MR ANGIOGRAPHY HEAD W/O DYE: CPT | Mod: TC

## 2019-08-31 PROCEDURE — 85610 PROTHROMBIN TIME: CPT

## 2019-08-31 PROCEDURE — 84484 ASSAY OF TROPONIN QUANT: CPT

## 2019-08-31 PROCEDURE — 96372 THER/PROPH/DIAG INJ SC/IM: CPT | Mod: 59

## 2019-08-31 PROCEDURE — 82962 GLUCOSE BLOOD TEST: CPT

## 2019-08-31 PROCEDURE — 99205 PR OFFICE/OUTPT VISIT, NEW, LEVL V, 60-74 MIN: ICD-10-PCS | Mod: 95,,, | Performed by: PSYCHIATRY & NEUROLOGY

## 2019-08-31 PROCEDURE — 81003 URINALYSIS AUTO W/O SCOPE: CPT | Mod: 59

## 2019-08-31 PROCEDURE — 99205 OFFICE O/P NEW HI 60 MIN: CPT | Mod: 95,,, | Performed by: PSYCHIATRY & NEUROLOGY

## 2019-08-31 PROCEDURE — 36415 COLL VENOUS BLD VENIPUNCTURE: CPT

## 2019-08-31 PROCEDURE — 80061 LIPID PANEL: CPT

## 2019-08-31 PROCEDURE — 80307 DRUG TEST PRSMV CHEM ANLYZR: CPT

## 2019-08-31 PROCEDURE — 93010 ELECTROCARDIOGRAM REPORT: CPT | Mod: ,,, | Performed by: INTERNAL MEDICINE

## 2019-08-31 PROCEDURE — 99291 CRITICAL CARE FIRST HOUR: CPT | Mod: 25

## 2019-08-31 PROCEDURE — 63600175 PHARM REV CODE 636 W HCPCS: Performed by: NURSE PRACTITIONER

## 2019-08-31 PROCEDURE — 83036 HEMOGLOBIN GLYCOSYLATED A1C: CPT

## 2019-08-31 PROCEDURE — 80053 COMPREHEN METABOLIC PANEL: CPT

## 2019-08-31 PROCEDURE — 85025 COMPLETE CBC W/AUTO DIFF WBC: CPT

## 2019-08-31 PROCEDURE — 70544 MRA BRAIN WITHOUT CONTRAST: ICD-10-PCS | Mod: 26,59,, | Performed by: RADIOLOGY

## 2019-08-31 PROCEDURE — 82565 ASSAY OF CREATININE: CPT

## 2019-08-31 PROCEDURE — 25500020 PHARM REV CODE 255: Performed by: EMERGENCY MEDICINE

## 2019-08-31 PROCEDURE — 93010 EKG 12-LEAD: ICD-10-PCS | Mod: ,,, | Performed by: INTERNAL MEDICINE

## 2019-08-31 PROCEDURE — 70544 MR ANGIOGRAPHY HEAD W/O DYE: CPT | Mod: 26,59,, | Performed by: RADIOLOGY

## 2019-08-31 PROCEDURE — 93005 ELECTROCARDIOGRAM TRACING: CPT

## 2019-08-31 RX ORDER — ONDANSETRON 2 MG/ML
4 INJECTION INTRAMUSCULAR; INTRAVENOUS EVERY 6 HOURS PRN
Status: DISCONTINUED | OUTPATIENT
Start: 2019-08-31 | End: 2019-09-01 | Stop reason: HOSPADM

## 2019-08-31 RX ORDER — ASPIRIN 81 MG/1
81 TABLET ORAL DAILY
Status: DISCONTINUED | OUTPATIENT
Start: 2019-08-31 | End: 2019-09-01 | Stop reason: HOSPADM

## 2019-08-31 RX ORDER — LABETALOL HYDROCHLORIDE 5 MG/ML
10 INJECTION, SOLUTION INTRAVENOUS EVERY 6 HOURS PRN
Status: DISCONTINUED | OUTPATIENT
Start: 2019-08-31 | End: 2019-09-01 | Stop reason: HOSPADM

## 2019-08-31 RX ORDER — CLOPIDOGREL BISULFATE 75 MG/1
75 TABLET ORAL DAILY
Status: DISCONTINUED | OUTPATIENT
Start: 2019-08-31 | End: 2019-09-01 | Stop reason: HOSPADM

## 2019-08-31 RX ORDER — ENOXAPARIN SODIUM 100 MG/ML
40 INJECTION SUBCUTANEOUS EVERY 24 HOURS
Status: DISCONTINUED | OUTPATIENT
Start: 2019-08-31 | End: 2019-09-01 | Stop reason: HOSPADM

## 2019-08-31 RX ORDER — ONDANSETRON 4 MG/1
4 TABLET, ORALLY DISINTEGRATING ORAL EVERY 6 HOURS PRN
Status: DISCONTINUED | OUTPATIENT
Start: 2019-08-31 | End: 2019-09-01 | Stop reason: HOSPADM

## 2019-08-31 RX ORDER — SODIUM CHLORIDE 0.9 % (FLUSH) 0.9 %
10 SYRINGE (ML) INJECTION
Status: DISCONTINUED | OUTPATIENT
Start: 2019-08-31 | End: 2019-09-01 | Stop reason: HOSPADM

## 2019-08-31 RX ORDER — AMOXICILLIN 250 MG
1 CAPSULE ORAL 2 TIMES DAILY PRN
Status: DISCONTINUED | OUTPATIENT
Start: 2019-08-31 | End: 2019-09-01 | Stop reason: HOSPADM

## 2019-08-31 RX ORDER — ATORVASTATIN CALCIUM 20 MG/1
80 TABLET, FILM COATED ORAL DAILY
Status: DISCONTINUED | OUTPATIENT
Start: 2019-08-31 | End: 2019-09-01 | Stop reason: HOSPADM

## 2019-08-31 RX ADMIN — ATORVASTATIN CALCIUM 80 MG: 20 TABLET, FILM COATED ORAL at 11:08

## 2019-08-31 RX ADMIN — Medication 81 MG: at 11:08

## 2019-08-31 RX ADMIN — IOHEXOL 100 ML: 350 INJECTION, SOLUTION INTRAVENOUS at 02:08

## 2019-08-31 RX ADMIN — ENOXAPARIN SODIUM 40 MG: 100 INJECTION SUBCUTANEOUS at 11:08

## 2019-08-31 NOTE — HPI
Ms. Cyndie Tinsley is a 40 y/o female who lives in [place], [place] at her residence. The patient's PCP is Dr. Morales at [facility]. The patient states she see's no other MDs. The patient states she has no pmh. The patient states she has a past surgical history of eye surgery and breast lumpectomy. The patient denies cigarette smoking, drinking alcohol, and she denies illicit drug use.     The patient presents to [facility] with complaints of numbness and tingling to the right side of her body. Per the patient she states her symptoms started this AM while she was preparing for work. Per the patient she waited a while, thinking the symptoms would subside. The patient states the symptoms did not subside, but she still attempted to go into work. On the drive to work the patient states she felt extremely lightheaded on the way to work. The patient states she then decided to come into the ED.    The ED workup revealed CO2---22, normal troponin. CXR--No detrimental change or radiographic acute intrathoracic process seen. CT of the head---Unremarkable noncontrast CT head specifically without evidence for acute intracranial hemorrhage. CTA of the head---No acute abnormality. No high-grade stenosis or major vessel occlusion. The patient will be admitted to the CDU for observation.

## 2019-08-31 NOTE — ED PROVIDER NOTES
Encounter Date: 8/31/2019    SCRIBE #1 NOTE: I, Jacob Salgado, am scribing for, and in the presence of,  . I have scribed the entire note.       History     Chief Complaint   Patient presents with    Numbness     Pt reports LKW 0800. States started having a numbness/tingling to R face which has traveled down entire R side of body, also some drooping of L face noted. States decreased sensation to R side with tingling.      Time seen by provider: 2:16 PM    Patient is a 38 yo  female who presents with complaint of right sided numbness/weakness that started approximately 0800 this morning (LSN/LRN 0800). Pt states she was in her normal state of health when she woke this morning and experienced acute numbness/weakness to entire R side of body. She describes the feeling a complete weakness her her R side of her body from her R shoulder to her R foot. She denies slurred speech, numbness/weakness on contralateral side. Furthermore, she denies any other symptoms.  Pt presented to ED at approximately 2PM at which time she was evaluated and found to have equal strength in all extremities,  but concerning findings for L sided facial droop. Stroke code immediately called in the ED and the patient taken to CT scanner.     The history is provided by the patient.     Review of patient's allergies indicates:   Allergen Reactions    Penicillins Anaphylaxis     Anaphylaxis^     Past Medical History:   Diagnosis Date    Edema      Past Surgical History:   Procedure Laterality Date    BREAST LUMPECTOMY      EYE SURGERY       No family history on file.  Social History     Tobacco Use    Smoking status: Never Smoker    Smokeless tobacco: Never Used   Substance Use Topics    Alcohol use: No    Drug use: No     Review of Systems   Constitutional: Negative for activity change, chills and fever.   HENT: Negative for congestion, ear discharge, ear pain, rhinorrhea and sore throat.    Eyes: Negative for  photophobia, pain, redness and visual disturbance.   Respiratory: Negative for cough, shortness of breath and wheezing.    Cardiovascular: Negative for chest pain and palpitations.   Gastrointestinal: Negative for abdominal pain, diarrhea, nausea and vomiting.   Genitourinary: Negative for dysuria, flank pain, frequency, hematuria, urgency, vaginal discharge and vaginal pain.   Musculoskeletal: Negative for back pain, myalgias and neck pain.   Skin: Negative for rash.   Allergic/Immunologic: Negative for immunocompromised state.   Neurological: Positive for weakness and numbness. Negative for dizziness, seizures, syncope, speech difficulty and light-headedness.   Psychiatric/Behavioral: Negative for agitation and confusion. The patient is not hyperactive.        Physical Exam     Initial Vitals [08/31/19 1357]   BP Pulse Resp Temp SpO2   129/80 82 18 98.8 °F (37.1 °C) 100 %      MAP       --         Physical Exam    Nursing note and vitals reviewed.  Constitutional: She appears well-developed and well-nourished.   HENT:   Head: Normocephalic and atraumatic.   Right Ear: External ear normal.   Left Ear: External ear normal.   Nose: Nose normal.   Mouth/Throat: Oropharynx is clear and moist.   No signs of head trauma  TMs clear bilaterally    Eyes: Conjunctivae and EOM are normal. Pupils are equal, round, and reactive to light.   No abnormal eye movements  No nystagmus    Neck: Neck supple.   Pt able to move neck in all directions without any significant limitation.    Cardiovascular: Normal rate, regular rhythm, normal heart sounds and intact distal pulses. Exam reveals no gallop and no friction rub.    No murmur heard.  RRR  No M/R/G   Pulmonary/Chest: Breath sounds normal. She has no wheezes. She has no rhonchi. She has no rales.   Lungs clear to auscultation bilaterally    Abdominal: Soft. She exhibits no distension. There is no tenderness.   Abdomen is soft/NT/ND   Musculoskeletal: Normal range of motion.    Neurological: She is alert and oriented to person, place, and time.   5/5 strength throughout  No pronator drift  No slurred speech  Able to furrow brow  L sided facial droop  CN II-XII grossly in tact, negative pronator drift, negative finger to nose, negative heel to shin, negative dysdiadochokinesia, negative Romberg, normal gait. GCS 15.    Skin: Skin is warm and dry. No rash noted. No erythema.   Psychiatric: She has a normal mood and affect. Thought content normal.         ED Course   Critical Care  Date/Time: 8/31/2019 5:36 PM  Performed by: Armando Campos MD  Authorized by: Armando Campos MD   Direct patient critical care time: 10 minutes  Additional history critical care time: 5 minutes  Ordering / reviewing critical care time: 5 minutes  Documentation critical care time: 5 minutes  Consulting other physicians critical care time: 5 minutes  Other critical care time: 10 minutes  Total critical care time (exclusive of procedural time) : 40 minutes        Labs Reviewed   COMPREHENSIVE METABOLIC PANEL - Abnormal; Notable for the following components:       Result Value    CO2 22 (*)     All other components within normal limits   URINALYSIS - Abnormal; Notable for the following components:    Occult Blood UA Trace (*)     All other components within normal limits   CBC W/ AUTO DIFFERENTIAL   PROTIME-INR   TSH   LIPID PANEL   TROPONIN I   CK   DRUG SCREEN PANEL, URINE EMERGENCY   TSH   HEMOGLOBIN A1C   URINALYSIS, REFLEX TO URINE CULTURE   POCT GLUCOSE, HAND-HELD DEVICE   POCT URINE PREGNANCY   POCT GLUCOSE   ISTAT CREATININE   ISTAT PROCEDURE     EKG Readings: (Independently Interpreted)   NSR; low voltage QRS; no significant ST elevation/depression/T wave change; no STEMI; rate 85 interpreted by self.        Imaging Results          X-Ray Chest AP Portable (Final result)  Result time 08/31/19 15:05:47    Final result by Carlos Schwab MD (08/31/19 15:05:47)                 Impression:      No  detrimental change or radiographic acute intrathoracic process seen.      Electronically signed by: Carlos Schwab MD  Date:    08/31/2019  Time:    15:05             Narrative:    EXAMINATION:  XR CHEST AP PORTABLE    CLINICAL HISTORY:  Stroke;    TECHNIQUE:  Single frontal view of the chest was performed.    COMPARISON:  Chest radiograph 2/10/19    FINDINGS:  No detrimental change.  Monitoring leads overlie the chest.The lungs are clear, with normal appearance of pulmonary vasculature and no pleural effusion or pneumothorax.    The cardiac silhouette is normal in size. The hilar and mediastinal contours are unremarkable.    No acute osseous process definitively seen.  PA and lateral views can be obtained.                               CTA Head and Neck (xpd) (Final result)  Result time 08/31/19 15:10:05   Procedure changed from CTA Brain     Final result by Alexandria Rosales MD (08/31/19 15:10:05)                 Impression:      No acute abnormality. No high-grade stenosis or major vessel occlusion.      Electronically signed by: Alexandria Rosales MD  Date:    08/31/2019  Time:    15:10             Narrative:    EXAMINATION:  CTA HEAD AND NECK (XPD)    CLINICAL HISTORY:  Stroke;    TECHNIQUE:  Non contrast low dose axial images were obtained through the head. CT angiogram was performed from the level of the babar to the top of the head following the IV administration of 100mL of Omnipaque 350.   Sagittal and coronal reconstructions and maximum intensity projection reconstructions were performed. Arterial stenosis percentages are based on NASCET measurement criteria.    COMPARISON:  08/31/2019    FINDINGS:  Intracranial Compartment:    Ventricles and sulci are normal in size for age without evidence of hydrocephalus. No extra-axial blood or fluid collections.    The brain parenchyma appears normal. No parenchymal mass, hemorrhage, edema, or major vascular distribution infarct.    Skull/Extracranial Contents (limited  evaluation): No fracture. Mastoid air cells and paranasal sinuses are essentially clear.    Non-Vascular Structures of the Neck/Thoracic Inlet (limited evaluation): Normal.    Aorta: Bovine configuration.    Extracranial carotid circulation: No hemodynamically significant stenosis, aneurysmal dilatation, or dissection.    Extracranial vertebral circulation: No hemodynamically significant stenosis, aneurysmal dilatation, or dissection.    Intracranial Arteries: No focal high-grade stenosis, occlusion, or aneurysm.    Venous structures (limited evaluation): Normal.                               CT Head Without Contrast (Final result)  Result time 08/31/19 14:14:39    Final result by Alexandria Rosales MD (08/31/19 14:14:39)                 Impression:      Unremarkable noncontrast CT head specifically without evidence for acute intracranial hemorrhage.  Clinical correlation and further evaluation as warranted.      Electronically signed by: Alexandria Rosales MD  Date:    08/31/2019  Time:    14:14             Narrative:    EXAMINATION:  CT HEAD WITHOUT CONTRAST    CLINICAL HISTORY:  Sensation loss;    TECHNIQUE:  Multiple sequential 5 mm axial images of the head without contrast.  Coronal and sagittal reformatted imaging from the axial acquisition.    COMPARISON:  02/10/2019    FINDINGS:  There is no evidence for acute intracranial hemorrhage or sulcal effacement.  Stable bilateral basal ganglia calcifications.  The ventricles are normal in size without hydrocephalus.  There is no midline shift or mass effect.  Visualized paranasal sinuses and mastoid air cells are clear.                                 Medical Decision Making:   Clinical Tests:   Lab Tests: Ordered and Reviewed  Radiological Study: Ordered and Reviewed  ED Management:  Stroke code immediately called.  CT head without negative for acute intracranial abnormality per final Radiology read  EKG: NSR; low voltage QRS; no significant ST elevation/depression/T  wave change; no STEMI; rate 85 interpreted by self.   Tele stroke initiated  Patient interviewed and examined by teleneurologist Dr. Slater who recommended CTA head/neck  CTA Head/Neck per final radiology report  Laboratory workup unremarkable  Pt reports feeling better as it relates to R sided body complaints  Facial droop resolved  Will admit to [facility] for further evaluation and workup of stroke-like symptoms  Pt in agreement with plan for admission                       Clinical Impression:       ICD-10-CM ICD-9-CM   1. Stroke-like symptoms R29.90 781.99   2. Stroke I63.9 434.91                   I, Armando Campos,  personally performed the services described in this documentation. All medical record entries made by the scribe were at my direction and in my presence.  I have reviewed the chart and agree that the record reflects my personal performance and is accurate and complete. Armando Campos M.D. 5:24 PM08/31/2019                      Armando Campos MD  08/31/19 4009

## 2019-08-31 NOTE — HPI
39 year old presents with crossed symptoms- left facial droop and r snsory changes worrisome for brainstem stroke  No risk risk factor. Started today at 0800. CT head no acute changes  Not a TPA candidate- may be intervention candidate. Will get CTA head and neck. If + LVO then thrombectomy

## 2019-08-31 NOTE — NURSING
Neurology paged for consult Spoke wit Dr. Lupe Rosales. Stated she is on a cruise. Paged Dr. Jiménez. He stated to page Dr. Lyn. Paged Dr. Lyn and waiting for call back.

## 2019-08-31 NOTE — SUBJECTIVE & OBJECTIVE
"  Woke up with symptoms?: no    Recent bleeding noted: no  Does the patient take any Blood Thinners? no  Medications: No relevant medications      Past Medical History: no relevant history    Past Surgical History: none    Family History: hypertension, diabetes, hyperlipidemia and MI/CAD    Social History: no smoking, no drinking, no drugs    Allergies: Penicillins No relevant allergies    Review of Systems   Neurological: Positive for facial asymmetry and numbness.   All other systems reviewed and are negative.    Objective:   Vitals: Blood pressure 129/80, pulse 82, temperature 98.8 °F (37.1 °C), temperature source Oral, resp. rate 18, height 5' 7" (1.702 m), weight 93 kg (205 lb), SpO2 100 %.      CT READ: Yes  No hemmorhage. No mass effect. No early infarct signs.     Physical Exam   Constitutional: She appears well-developed.   HENT:   Head: Normocephalic.   Eyes: Pupils are equal, round, and reactive to light.   Neck: Normal range of motion.   Cardiovascular: Normal rate.   Pulmonary/Chest: Effort normal.   Abdominal: Soft.   Skin: Skin is warm.   Psychiatric: She has a normal mood and affect.         "

## 2019-08-31 NOTE — CONSULTS
[facility]  Vascular Neurology  Comprehensive Stroke Center  Tele-Consultation Note      Inpatient consult to Telemedicine-Stroke  Consult performed by: Fransisco Slater MD  Consult ordered by: Stevan Husain MD          Consulting Provider: STEVAN HUSAIN  Current Providers  No providers found    Patient Location:  [facility] EMERGENCY DEPARTMENT Emergency Department  Spoke hospital nurse at bedside with patient assisting consultant.     Patient information was obtained from patient.         Assessment/Plan:     STROKE DOCUMENTATION     Acute Stroke Times:   Acute Stroke Times   Last Known Normal Date: 08/31/19  Last Known Normal Time: 0800  Stroke Team Arrival Date: 08/31/19  Stroke Team Arrival Time: 1410  CT Interpretation Time: 1415    NIH Scale:  1a. Level of Consciousness: 0-->Alert, keenly responsive  1b. LOC Questions: 0-->Answers both questions correctly  1c. LOC Commands: 0-->Performs both tasks correctly  2. Best Gaze: 0-->Normal  3. Visual: 0-->No visual loss  4. Facial Palsy: 1-->Minor paralysis (flattened nasolabial fold, asymmetry on smiling)  5a. Motor Arm, Left: 0-->No drift, limb holds 90 (or 45) degrees for full 10 secs  5b. Motor Arm, Right: 0-->No drift, limb holds 90 (or 45) degrees for full 10 secs  6a. Motor Leg, Left: 0-->No drift, leg holds 30 degree position for full 5 secs  6b. Motor Leg, Right: 0-->No drift, leg holds 30 degree position for full 5 secs  7. Limb Ataxia: 0-->Absent  8. Sensory: 1-->Mild-to-moderate sensory loss, patient feels pinprick is less sharp or is dull on the affected side, or there is a loss of superficial pain with pinprick, but patient is aware of being touched  9. Best Language: 0-->No aphasia, normal  10. Dysarthria: 0-->Normal  11. 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 Findings are mild overall  Not TPA candidate. Will get CTA and consider for thrombectomy    Antithrombotics for secondary stroke prevention: Antiplatelets: Aspirin: 81 mg daily  Clopidogrel: 75 mg daily    Statins for secondary stroke prevention and hyperlipidemia, if present:   Statins: Atorvastatin- 80 mg daily    Aggressive risk factor modification: Diet, Exercise, Obesity     Rehab efforts: The patient has been evaluated by a stroke team provider and the therapy needs have been fully considered based off the presenting complaints and exam findings. The following therapy evaluations are needed: PT evaluate and treat, OT evaluate and treat, SLP evaluate and treat, PM&R evaluate for appropriate placement    Diagnostics ordered/pending: CTA Head to assess vasculature , CTA Neck/Arch to assess vasculature, HgbA1C to assess blood glucose levels, Lipid Profile to assess cholesterol levels, MRA head to assess vasculature, MRA neck/arch to assess vasculature, MRI head without contrast to assess brain parenchyma, TTE to assess cardiac function/status     VTE prophylaxis: Enoxaparin 40 mg SQ every 24 hours    BP parameters: Infarct: No intervention, SBP <220            Blood pressure 129/80, pulse 82, temperature 98.8 °F (37.1 °C), temperature source Oral, resp. rate 18, height 5' 7" (1.702 m), weight 93 kg (205 lb), SpO2 100 %.  Alteplase Eligible?: No  Alteplase Recommendation: Alteplase not recommended due to Outside of treatment window   Possible Interventional Revascularization Candidate? Yes    Disposition Recommendation: pending further studies  admit to inpatient  do not transfer    Subjective:     History of Present Illness:  39 year old presents with crossed symptoms- left facial droop and " "r snsory changes worrisome for brainstem stroke  No risk risk factor. Started today at 0800. CT head no acute changes  Not a TPA candidate- may be intervention candidate. Will get CTA head and neck. If + LVO then thrombectomy      Woke up with symptoms?: no    Recent bleeding noted: no  Does the patient take any Blood Thinners? no  Medications: No relevant medications      Past Medical History: no relevant history    Past Surgical History: none    Family History: hypertension, diabetes, hyperlipidemia and MI/CAD    Social History: no smoking, no drinking, no drugs    Allergies: Penicillins No relevant allergies    Review of Systems   Neurological: Positive for facial asymmetry and numbness.   All other systems reviewed and are negative.    Objective:   Vitals: Blood pressure 129/80, pulse 82, temperature 98.8 °F (37.1 °C), temperature source Oral, resp. rate 18, height 5' 7" (1.702 m), weight 93 kg (205 lb), SpO2 100 %.      CT READ: Yes  No hemmorhage. No mass effect. No early infarct signs.     Physical Exam   Constitutional: She appears well-developed.   HENT:   Head: Normocephalic.   Eyes: Pupils are equal, round, and reactive to light.   Neck: Normal range of motion.   Cardiovascular: Normal rate.   Pulmonary/Chest: Effort normal.   Abdominal: Soft.   Skin: Skin is warm.   Psychiatric: She has a normal mood and affect.             Recommended the emergency room physician to have a brief discussion with the patient and/or family if available regarding the risks and benefits of treatment, and to briefly document the occurrence of that discussion in his clinical encounter note.     The attending portion of this evaluation, treatment, and documentation was performed per Fransisco Slater MD via audiovisual.    Billing code:  (moderate to severe stroke, large areas of edema, some mimics)    · This patient has a critical neurological condition/illness, with high morbidity and mortality.  · There is a high " probability for acute neurological change leading to clinical and possibly life-threatening deterioration requiring highest level of physician preparedness for urgent intervention.  · Care was coordinated with other physicians involved in the patient's care.  · Radiologic studies and laboratory data were reviewed and interpreted, and plan of care was re-assessed based on the results.  · Diagnosis, treatment options and prognosis may have been discussed with the patient and/or family members or caregiver.  · Further advanced medical management and further evaluation is warranted for his care.      In your opinion, this was a: Tier 1 Van Negative    Consult End Time: 2:26 PM     Fransisco Slater MD  Comprehensive Stroke Center  Vascular Neurology   [facility]

## 2019-08-31 NOTE — SUBJECTIVE & OBJECTIVE
Past Medical History:   Diagnosis Date    Edema        Past Surgical History:   Procedure Laterality Date    BREAST LUMPECTOMY      EYE SURGERY         Review of patient's allergies indicates:   Allergen Reactions    Penicillins Anaphylaxis     Anaphylaxis^       No current facility-administered medications on file prior to encounter.      No current outpatient medications on file prior to encounter.     Family History     Problem Relation (Age of Onset)    Cancer Mother, Paternal Grandmother    Heart disease Mother, Father, Maternal Aunt, Maternal Uncle, Maternal Grandmother, Maternal Grandfather        Tobacco Use    Smoking status: Never Smoker    Smokeless tobacco: Never Used   Substance and Sexual Activity    Alcohol use: No    Drug use: No    Sexual activity: Yes     Partners: Male     Birth control/protection: Condom     Review of Systems   Constitutional: Positive for activity change. Negative for appetite change, chills, diaphoresis, fatigue and fever.   HENT: Positive for dental problem (dental caries). Negative for trouble swallowing.    Eyes: Negative for visual disturbance.   Respiratory: Negative for cough, shortness of breath and wheezing.    Cardiovascular: Negative for chest pain, palpitations and leg swelling.   Gastrointestinal: Negative for abdominal distention, abdominal pain, blood in stool, constipation, diarrhea, nausea and vomiting.   Genitourinary: Negative for difficulty urinating and hematuria.   Musculoskeletal: Negative for arthralgias, back pain, gait problem and myalgias.   Skin: Negative for color change, pallor, rash and wound.   Neurological: Positive for dizziness, light-headedness and numbness. Negative for tremors, seizures, syncope, facial asymmetry, speech difficulty, weakness and headaches.   Psychiatric/Behavioral: Negative for agitation, confusion and hallucinations. The patient is not nervous/anxious.      Objective:     Vital Signs (Most Recent):  Temp: 99 °F  (37.2 °C) (08/31/19 1721)  Pulse: 81 (08/31/19 1738)  Resp: 16 (08/31/19 1721)  BP: 134/82 (08/31/19 1721)  SpO2: 100 % (08/31/19 1721) Vital Signs (24h Range):  Temp:  [98.8 °F (37.1 °C)-99 °F (37.2 °C)] 99 °F (37.2 °C)  Pulse:  [] 81  Resp:  [11-30] 16  SpO2:  [97 %-100 %] 100 %  BP: (121-140)/(66-82) 134/82     Weight: 100.6 kg (221 lb 12.8 oz)  Body mass index is 34.74 kg/m².    Physical Exam   Constitutional: She is oriented to person, place, and time. She appears well-developed and well-nourished. No distress.   HENT:   Head: Normocephalic and atraumatic.   Mouth/Throat: Abnormal dentition.   Eyes: Pupils are equal, round, and reactive to light.   Neck: Normal range of motion. No JVD present.   Cardiovascular: Normal rate, regular rhythm, normal heart sounds and intact distal pulses.   No murmur heard.  Pulmonary/Chest: Effort normal and breath sounds normal. No respiratory distress. She has no wheezes.   Abdominal: Soft. Bowel sounds are normal. She exhibits no distension. There is no tenderness. There is no guarding.   Musculoskeletal: Normal range of motion.   Neurological: She is alert and oriented to person, place, and time.   Skin: Skin is warm. Capillary refill takes less than 2 seconds. No rash noted. She is not diaphoretic. No erythema. No pallor.   Psychiatric: She has a normal mood and affect. Her behavior is normal. Judgment and thought content normal.   Nursing note and vitals reviewed.        CRANIAL NERVES     CN III, IV, VI   Pupils are equal, round, and reactive to light.       Significant Labs:   A1C: No results for input(s): HGBA1C in the last 4320 hours.  CBC:   Recent Labs   Lab 08/31/19  1430   WBC 6.47   HGB 13.2   HCT 40.3        CMP:   Recent Labs   Lab 08/31/19  1430      K 3.7      CO2 22*   GLU 97   BUN 9   CREATININE 0.9   CALCIUM 10.0   PROT 7.9   ALBUMIN 4.1   BILITOT 0.5   ALKPHOS 76   AST 20   ALT 15   ANIONGAP 12   EGFRNONAA >60     Coagulation:    Recent Labs   Lab 08/31/19  1430   INR 1.1     Lipid Panel:   Recent Labs   Lab 08/31/19  1430   CHOL 182   HDL 46   LDLCALC 123.4   TRIG 63   CHOLHDL 25.3     POCT Glucose:   Recent Labs   Lab 08/31/19  1408   POCTGLUCOSE 96     Troponin:   Recent Labs   Lab 08/31/19  1430   TROPONINI <0.006     TSH:   Recent Labs   Lab 08/31/19  1430   TSH 0.990     Urine Culture: No results for input(s): LABURIN in the last 48 hours.  Urine Studies:   Recent Labs   Lab 08/31/19  1519   COLORU Yellow   APPEARANCEUA Clear   PHUR 6.0   SPECGRAV 1.010   PROTEINUA Negative   GLUCUA Negative   KETONESU Negative   BILIRUBINUA Negative   OCCULTUA Trace*   NITRITE Negative   UROBILINOGEN Negative   LEUKOCYTESUR Negative       Significant Imaging: I have reviewed all pertinent imaging results/findings within the past 24 hours.

## 2019-08-31 NOTE — ASSESSMENT & PLAN NOTE
Patient with no past medical history, presents to the ED with symptoms of right sided facial numbness/tingling. The patient states she has experienced numbness/tingling to the right upper and lower extremity. The pateint states the symptoms started this morning.     -evaluated by Vascular neuro  -CT of the head---Unremarkable noncontrast CT head specifically without evidence for acute intracranial hemorrhage.    -CTA of the H/N---No acute abnormality. No high-grade stenosis or major vessel occlusion.  -MRI of the head ordered stat  -MRA H/N ordered stat  -TTE with bubble study  -Neuro consulted  -will follow vascular neuro's recommendations: start statin, start ASA 81 mg Po daily, start Plavix 75 mg PO daily  -fall precautions  -aspiration precautions  -neuro checks q 4  -consulted PT/OT/SLP

## 2019-08-31 NOTE — H&P
[facility] Hospital Medicine  History & Physical    Patient Name: Cyndie Tinsley  MRN: [id number]  Admission Date: 8/31/2019  Attending Physician: Maddie Nazario*   Primary Care Provider: Provider [employer]         Patient information was obtained from patient, relative(s), past medical records and ER records.     Subjective:     Principal Problem:Stroke    Chief Complaint:   Chief Complaint   Patient presents with    Numbness     Pt reports [id number]. States started having a numbness/tingling to R face which has traveled down entire R side of body, also some drooping of L face noted. States decreased sensation to R side with tingling.         HPI: Ms. Cyndie Tinsley is a 40 y/o female who lives in [place], [place] at her residence. The patient's PCP is Dr. Morales at [facility]. The patient states she see's no other MDs. The patient states she has no pmh. The patient states she has a past surgical history of eye surgery and breast lumpectomy. The patient denies cigarette smoking, drinking alcohol, and she denies illicit drug use.     The patient presents to [facility] with complaints of numbness and tingling to the right side of her body. Per the patient she states her symptoms started this AM while she was preparing for work. Per the patient she waited a while, thinking the symptoms would subside. The patient states the symptoms did not subside, but she still attempted to go into work. On the drive to work the patient states she felt extremely lightheaded on the way to work. The patient states she then decided to come into the ED.    The ED workup revealed CO2---22, normal troponin. CXR--No detrimental change or radiographic acute intrathoracic process seen. CT of the head---Unremarkable noncontrast CT head specifically without evidence for acute intracranial hemorrhage. CTA of the head---No acute abnormality. No high-grade stenosis or major  vessel occlusion. The patient will be admitted to the CDU for observation.     Past Medical History:   Diagnosis Date    Edema        Past Surgical History:   Procedure Laterality Date    BREAST LUMPECTOMY      EYE SURGERY         Review of patient's allergies indicates:   Allergen Reactions    Penicillins Anaphylaxis     Anaphylaxis^       No current facility-administered medications on file prior to encounter.      No current outpatient medications on file prior to encounter.     Family History     Problem Relation (Age of Onset)    Cancer Mother, Paternal Grandmother    Heart disease Mother, Father, Maternal Aunt, Maternal Uncle, Maternal Grandmother, Maternal Grandfather        Tobacco Use    Smoking status: Never Smoker    Smokeless tobacco: Never Used   Substance and Sexual Activity    Alcohol use: No    Drug use: No    Sexual activity: Yes     Partners: Male     Birth control/protection: Condom     Review of Systems   Constitutional: Positive for activity change. Negative for appetite change, chills, diaphoresis, fatigue and fever.   HENT: Positive for dental problem (dental caries). Negative for trouble swallowing.    Eyes: Negative for visual disturbance.   Respiratory: Negative for cough, shortness of breath and wheezing.    Cardiovascular: Negative for chest pain, palpitations and leg swelling.   Gastrointestinal: Negative for abdominal distention, abdominal pain, blood in stool, constipation, diarrhea, nausea and vomiting.   Genitourinary: Negative for difficulty urinating and hematuria.   Musculoskeletal: Negative for arthralgias, back pain, gait problem and myalgias.   Skin: Negative for color change, pallor, rash and wound.   Neurological: Positive for dizziness, light-headedness and numbness. Negative for tremors, seizures, syncope, facial asymmetry, speech difficulty, weakness and headaches.   Psychiatric/Behavioral: Negative for agitation, confusion and hallucinations. The patient is not  nervous/anxious.      Objective:     Vital Signs (Most Recent):  Temp: 99 °F (37.2 °C) (08/31/19 1721)  Pulse: 81 (08/31/19 1738)  Resp: 16 (08/31/19 1721)  BP: 134/82 (08/31/19 1721)  SpO2: 100 % (08/31/19 1721) Vital Signs (24h Range):  Temp:  [98.8 °F (37.1 °C)-99 °F (37.2 °C)] 99 °F (37.2 °C)  Pulse:  [] 81  Resp:  [11-30] 16  SpO2:  [97 %-100 %] 100 %  BP: (121-140)/(66-82) 134/82     Weight: 100.6 kg (221 lb 12.8 oz)  Body mass index is 34.74 kg/m².    Physical Exam   Constitutional: She is oriented to person, place, and time. She appears well-developed and well-nourished. No distress.   HENT:   Head: Normocephalic and atraumatic.   Mouth/Throat: Abnormal dentition.   Eyes: Pupils are equal, round, and reactive to light.   Neck: Normal range of motion. No JVD present.   Cardiovascular: Normal rate, regular rhythm, normal heart sounds and intact distal pulses.   No murmur heard.  Pulmonary/Chest: Effort normal and breath sounds normal. No respiratory distress. She has no wheezes.   Abdominal: Soft. Bowel sounds are normal. She exhibits no distension. There is no tenderness. There is no guarding.   Musculoskeletal: Normal range of motion.   Neurological: She is alert and oriented to person, place, and time.   Skin: Skin is warm. Capillary refill takes less than 2 seconds. No rash noted. She is not diaphoretic. No erythema. No pallor.   Psychiatric: She has a normal mood and affect. Her behavior is normal. Judgment and thought content normal.   Nursing note and vitals reviewed.        CRANIAL NERVES     CN III, IV, VI   Pupils are equal, round, and reactive to light.       Significant Labs:   A1C: No results for input(s): HGBA1C in the last 4320 hours.  CBC:   Recent Labs   Lab 08/31/19  1430   WBC 6.47   HGB 13.2   HCT 40.3        CMP:   Recent Labs   Lab 08/31/19  1430      K 3.7      CO2 22*   GLU 97   BUN 9   CREATININE 0.9   CALCIUM 10.0   PROT 7.9   ALBUMIN 4.1   BILITOT 0.5    ALKPHOS 76   AST 20   ALT 15   ANIONGAP 12   EGFRNONAA >60     Coagulation:   Recent Labs   Lab 08/31/19  1430   INR 1.1     Lipid Panel:   Recent Labs   Lab 08/31/19  1430   CHOL 182   HDL 46   LDLCALC 123.4   TRIG 63   CHOLHDL 25.3     POCT Glucose:   Recent Labs   Lab 08/31/19  1408   POCTGLUCOSE 96     Troponin:   Recent Labs   Lab 08/31/19  1430   TROPONINI <0.006     TSH:   Recent Labs   Lab 08/31/19  1430   TSH 0.990     Urine Culture: No results for input(s): LABURIN in the last 48 hours.  Urine Studies:   Recent Labs   Lab 08/31/19  1519   COLORU Yellow   APPEARANCEUA Clear   PHUR 6.0   SPECGRAV 1.010   PROTEINUA Negative   GLUCUA Negative   KETONESU Negative   BILIRUBINUA Negative   OCCULTUA Trace*   NITRITE Negative   UROBILINOGEN Negative   LEUKOCYTESUR Negative       Significant Imaging: I have reviewed all pertinent imaging results/findings within the past 24 hours.    Assessment/Plan:     Stroke  Patient with no past medical history, presents to the ED with symptoms of right sided facial numbness/tingling. The patient states she has experienced numbness/tingling to the right upper and lower extremity. The pateint states the symptoms started this morning.     -evaluated by Vascular neuro  -CT of the head---Unremarkable noncontrast CT head specifically without evidence for acute intracranial hemorrhage.    -CTA of the H/N---No acute abnormality. No high-grade stenosis or major vessel occlusion.  -MRI of the head ordered stat  -MRA H/N ordered stat  -TTE with bubble study  -Neuro consulted  -will follow vascular neuro's recommendations: start statin, start ASA 81 mg Po daily, start Plavix 75 mg PO daily  -fall precautions  -aspiration precautions  -neuro checks q 4  -consulted PT/OT/SLP          VTE Risk Mitigation (From admission, onward)        Ordered     enoxaparin injection 40 mg  Daily      08/31/19 1650     IP VTE HIGH RISK PATIENT  Once      08/31/19 1650     Place sequential compression device   Until discontinued      08/31/19 1655             Bel Mckeon NP  Department of Hospital Medicine   [facility]

## 2019-08-31 NOTE — ASSESSMENT & PLAN NOTE
SEE HPI    New onset left facial droop and R densory change worrisome for brainstem event. Findings are mild overall  Not TPA candidate. Will get CTA and consider for thrombectomy    Antithrombotics for secondary stroke prevention: Antiplatelets: Aspirin: 81 mg daily  Clopidogrel: 75 mg daily    Statins for secondary stroke prevention and hyperlipidemia, if present:   Statins: Atorvastatin- 80 mg daily    Aggressive risk factor modification: Diet, Exercise, Obesity     Rehab efforts: The patient has been evaluated by a stroke team provider and the therapy needs have been fully considered based off the presenting complaints and exam findings. The following therapy evaluations are needed: PT evaluate and treat, OT evaluate and treat, SLP evaluate and treat, PM&R evaluate for appropriate placement    Diagnostics ordered/pending: CTA Head to assess vasculature , CTA Neck/Arch to assess vasculature, HgbA1C to assess blood glucose levels, Lipid Profile to assess cholesterol levels, MRA head to assess vasculature, MRA neck/arch to assess vasculature, MRI head without contrast to assess brain parenchyma, TTE to assess cardiac function/status     VTE prophylaxis: Enoxaparin 40 mg SQ every 24 hours    BP parameters: Infarct: No intervention, SBP <220

## 2019-08-31 NOTE — ED TRIAGE NOTES
Reports to ED c  R sided weakness since 0800 this AM.  Reports numbness to R side as well.  L facial droop present. R side parietal HA. NADN.  VS stable. Denies HTN.  Denies pervious strokes.

## 2019-09-01 VITALS
WEIGHT: 221.81 LBS | TEMPERATURE: 99 F | HEART RATE: 95 BPM | HEIGHT: 67 IN | BODY MASS INDEX: 34.81 KG/M2 | OXYGEN SATURATION: 100 % | RESPIRATION RATE: 15 BRPM | DIASTOLIC BLOOD PRESSURE: 57 MMHG | SYSTOLIC BLOOD PRESSURE: 106 MMHG

## 2019-09-01 PROBLEM — I63.9 STROKE: Status: RESOLVED | Noted: 2019-08-31 | Resolved: 2019-09-01

## 2019-09-01 LAB
ALBUMIN SERPL BCP-MCNC: 3.6 G/DL (ref 3.5–5.2)
ALP SERPL-CCNC: 63 U/L (ref 55–135)
ALT SERPL W/O P-5'-P-CCNC: 13 U/L (ref 10–44)
ANION GAP SERPL CALC-SCNC: 10 MMOL/L (ref 8–16)
APTT BLDCRRT: 31.7 SEC (ref 21–32)
AST SERPL-CCNC: 18 U/L (ref 10–40)
BASOPHILS # BLD AUTO: 0.01 K/UL (ref 0–0.2)
BASOPHILS NFR BLD: 0.2 % (ref 0–1.9)
BILIRUB SERPL-MCNC: 0.8 MG/DL (ref 0.1–1)
BILIRUB UR QL STRIP: ABNORMAL
BUN SERPL-MCNC: 10 MG/DL (ref 6–20)
CALCIUM SERPL-MCNC: 9.3 MG/DL (ref 8.7–10.5)
CHLORIDE SERPL-SCNC: 108 MMOL/L (ref 95–110)
CK MB SERPL-MCNC: 0.2 NG/ML (ref 0.1–6.5)
CK MB SERPL-RTO: 0.6 % (ref 0–5)
CK SERPL-CCNC: 31 U/L (ref 20–180)
CLARITY UR: CLEAR
CO2 SERPL-SCNC: 23 MMOL/L (ref 23–29)
COLOR UR: YELLOW
CREAT SERPL-MCNC: 0.9 MG/DL (ref 0.5–1.4)
DIFFERENTIAL METHOD: ABNORMAL
EOSINOPHIL # BLD AUTO: 0.1 K/UL (ref 0–0.5)
EOSINOPHIL NFR BLD: 1.8 % (ref 0–8)
ERYTHROCYTE [DISTWIDTH] IN BLOOD BY AUTOMATED COUNT: 13.6 % (ref 11.5–14.5)
EST. GFR  (AFRICAN AMERICAN): >60 ML/MIN/1.73 M^2
EST. GFR  (NON AFRICAN AMERICAN): >60 ML/MIN/1.73 M^2
GLUCOSE SERPL-MCNC: 100 MG/DL (ref 70–110)
GLUCOSE UR QL STRIP: NEGATIVE
HCT VFR BLD AUTO: 36.7 % (ref 37–48.5)
HGB BLD-MCNC: 11.8 G/DL (ref 12–16)
HGB UR QL STRIP: NEGATIVE
INR PPP: 1.1 (ref 0.8–1.2)
KETONES UR QL STRIP: ABNORMAL
LEUKOCYTE ESTERASE UR QL STRIP: NEGATIVE
LYMPHOCYTES # BLD AUTO: 1.9 K/UL (ref 1–4.8)
LYMPHOCYTES NFR BLD: 34.2 % (ref 18–48)
MAGNESIUM SERPL-MCNC: 1.9 MG/DL (ref 1.6–2.6)
MCH RBC QN AUTO: 29.5 PG (ref 27–31)
MCHC RBC AUTO-ENTMCNC: 32.2 G/DL (ref 32–36)
MCV RBC AUTO: 92 FL (ref 82–98)
MONOCYTES # BLD AUTO: 0.3 K/UL (ref 0.3–1)
MONOCYTES NFR BLD: 4.6 % (ref 4–15)
NEUTROPHILS # BLD AUTO: 3.2 K/UL (ref 1.8–7.7)
NEUTROPHILS NFR BLD: 59.2 % (ref 38–73)
NITRITE UR QL STRIP: NEGATIVE
PH UR STRIP: 6 [PH] (ref 5–8)
PHOSPHATE SERPL-MCNC: 3.1 MG/DL (ref 2.7–4.5)
PLATELET # BLD AUTO: 303 K/UL (ref 150–350)
PMV BLD AUTO: 9.5 FL (ref 9.2–12.9)
POTASSIUM SERPL-SCNC: 3.5 MMOL/L (ref 3.5–5.1)
PROT SERPL-MCNC: 6.8 G/DL (ref 6–8.4)
PROT UR QL STRIP: NEGATIVE
PROTHROMBIN TIME: 11.8 SEC (ref 9–12.5)
RBC # BLD AUTO: 4 M/UL (ref 4–5.4)
SODIUM SERPL-SCNC: 141 MMOL/L (ref 136–145)
SP GR UR STRIP: >=1.03 (ref 1–1.03)
TROPONIN I SERPL DL<=0.01 NG/ML-MCNC: <0.006 NG/ML (ref 0–0.03)
URN SPEC COLLECT METH UR: ABNORMAL
UROBILINOGEN UR STRIP-ACNC: NEGATIVE EU/DL
WBC # BLD AUTO: 5.44 K/UL (ref 3.9–12.7)

## 2019-09-01 PROCEDURE — 82553 CREATINE MB FRACTION: CPT

## 2019-09-01 PROCEDURE — 82550 ASSAY OF CK (CPK): CPT

## 2019-09-01 PROCEDURE — 25000003 PHARM REV CODE 250: Performed by: NURSE PRACTITIONER

## 2019-09-01 PROCEDURE — 85730 THROMBOPLASTIN TIME PARTIAL: CPT

## 2019-09-01 PROCEDURE — 80053 COMPREHEN METABOLIC PANEL: CPT

## 2019-09-01 PROCEDURE — 36415 COLL VENOUS BLD VENIPUNCTURE: CPT

## 2019-09-01 PROCEDURE — 96374 THER/PROPH/DIAG INJ IV PUSH: CPT

## 2019-09-01 PROCEDURE — 97530 THERAPEUTIC ACTIVITIES: CPT

## 2019-09-01 PROCEDURE — 63600175 PHARM REV CODE 636 W HCPCS: Performed by: NURSE PRACTITIONER

## 2019-09-01 PROCEDURE — 94761 N-INVAS EAR/PLS OXIMETRY MLT: CPT

## 2019-09-01 PROCEDURE — 92610 EVALUATE SWALLOWING FUNCTION: CPT

## 2019-09-01 PROCEDURE — 83735 ASSAY OF MAGNESIUM: CPT

## 2019-09-01 PROCEDURE — 81003 URINALYSIS AUTO W/O SCOPE: CPT

## 2019-09-01 PROCEDURE — 85025 COMPLETE CBC W/AUTO DIFF WBC: CPT

## 2019-09-01 PROCEDURE — 97161 PT EVAL LOW COMPLEX 20 MIN: CPT

## 2019-09-01 PROCEDURE — 97165 OT EVAL LOW COMPLEX 30 MIN: CPT

## 2019-09-01 PROCEDURE — 85610 PROTHROMBIN TIME: CPT

## 2019-09-01 PROCEDURE — 84484 ASSAY OF TROPONIN QUANT: CPT

## 2019-09-01 PROCEDURE — G0378 HOSPITAL OBSERVATION PER HR: HCPCS

## 2019-09-01 PROCEDURE — 84100 ASSAY OF PHOSPHORUS: CPT

## 2019-09-01 RX ORDER — ACETAMINOPHEN 325 MG/1
650 TABLET ORAL EVERY 4 HOURS PRN
Status: DISCONTINUED | OUTPATIENT
Start: 2019-09-01 | End: 2019-09-01 | Stop reason: HOSPADM

## 2019-09-01 RX ORDER — ATORVASTATIN CALCIUM 40 MG/1
40 TABLET, FILM COATED ORAL DAILY
Qty: 90 TABLET | Refills: 3 | Status: SHIPPED | OUTPATIENT
Start: 2019-09-02 | End: 2020-09-01

## 2019-09-01 RX ORDER — KETOROLAC TROMETHAMINE 30 MG/ML
15 INJECTION, SOLUTION INTRAMUSCULAR; INTRAVENOUS ONCE
Status: COMPLETED | OUTPATIENT
Start: 2019-09-01 | End: 2019-09-01

## 2019-09-01 RX ORDER — ASPIRIN 81 MG/1
81 TABLET ORAL DAILY
Refills: 0 | COMMUNITY
Start: 2019-09-02 | End: 2020-09-01

## 2019-09-01 RX ADMIN — KETOROLAC TROMETHAMINE 15 MG: 30 INJECTION, SOLUTION INTRAMUSCULAR at 03:09

## 2019-09-01 RX ADMIN — Medication 81 MG: at 09:09

## 2019-09-01 RX ADMIN — ACETAMINOPHEN 650 MG: 325 TABLET ORAL at 09:09

## 2019-09-01 RX ADMIN — CLOPIDOGREL BISULFATE 75 MG: 75 TABLET ORAL at 09:09

## 2019-09-01 RX ADMIN — ATORVASTATIN CALCIUM 80 MG: 20 TABLET, FILM COATED ORAL at 09:09

## 2019-09-01 NOTE — PLAN OF CARE
Problem: Adult Inpatient Plan of Care  Goal: Plan of Care Review  Outcome: Outcome(s) achieved Date Met: 09/01/19  Pt AAO x 4.  VSS.  Pt remained afebrile throughout this shift.   Pt remained free of falls this shift.   Pt c/o pain this shift.  PRN analgesics administered as ordered.   Plan of care reviewed. Patient verbalizes understanding.   Pt moving/turing independently.  Patient  SR on monitor.   Bed low, side rails up x 3, wheels locked, call light in reach.   Pt family member remained at bedside most of shift.   Bed alarm maintained for safety.   Patient instructed to call for assistance.   Hourly rounding completed. Patient is being discharge to home, IV out, tele monitor off, pt verbalizes understanding

## 2019-09-01 NOTE — PLAN OF CARE
Problem: Occupational Therapy Goal  Goal: Occupational Therapy Goal  No goals set 2/2 pt performing at baseline.      Outcome: Outcome(s) achieved Date Met: 09/01/19  Pt performing skills at baseline with no further OT needs; please reconsult if necessary. Recommend return home with no DME needs

## 2019-09-01 NOTE — DISCHARGE INSTRUCTIONS
Stroke, Risk Factors for (English) View Edit Remove   Stroke, What is Ischemic (English) View Edit Remove   Aspirin, ASA oral tablets (English) View Edit Remove   Atorvastatin tablets (English) View Edit Remove

## 2019-09-01 NOTE — CONSULTS
NEUROLOGY FLOOR CONSULT    Reason for consult: Left sided numbness     Informant:  Pt       Other sources of information : chart review    CC:  Left sided numbness    HPI:   Cyndie Tinsley is a 39 y.o. year old right handed female with no significant PMHx  who was admitted to [facility] due to left sided numbness/tingling on 7/31/2019.  Pt states she was in her USH which includes being able to perform all ADL independently, not taking any medication, until yesterday around 8:00am when she begun to experience right sided pressure like headache radiated to around her rt ear/lower face, followed by numbness in her right arm and right leg with tingling in her right fingers and toes, lasting 2-3 minutes. Pt denies any visual changes, photophobia, sonophobia, focal weakness, vertigo or LOC. Those episodes recurred after 10-15 minutes. Pt does reports at least 6 of those events. Pt decided to come to the ED around 2pm. She was stroke activated due to concern for left facial droop. No tPA was given due to pt out of window. CTH/CTA unremarkable. Brain MRI/ MRA was performed by Vascular recommendations, negative for acute ischemic stroke or stenosis, aneurysmal dilatation, or dissection. Since pt was admitted her symptoms has resolved. Pt was started on Aspirin 81 mg, Plavix 75 mg and atorvastatin 80 mg daily.  Of note, pt was admitted on 2/2019 due to a syncopal event. At that time she was taking a shower, begun with dizziness, lightheadedness followed by loss of consciousness. Unable to recall more details. CTH and TTE were unremarkable. Diagnosis at discharge was vasovagal syncope versus dehydration. She was told to f/u with Neurology but she missed the appt.  She does report another episode of LOC 4 years ago related to hot weather. She states she get pressure-like headaches just while fasting. Eating and goody power provides relief of her headaches.       ROS: Per HPI    Histories:     Allergies:   Penicillins    Current Medications:    Current Facility-Administered Medications   Medication Dose Route Frequency Provider Last Rate Last Dose    acetaminophen tablet 650 mg  650 mg Oral Q4H PRN Aredarrenva L. Mike NP   650 mg at 09/01/19 0952    aspirin EC tablet 81 mg  81 mg Oral Daily Arekeva LRachana Morgan-Delores, NP   81 mg at 09/01/19 0915    atorvastatin tablet 80 mg  80 mg Oral Daily Arekeva LRachana Morgan-Delores, NP   80 mg at 09/01/19 0914    clopidogrel tablet 75 mg  75 mg Oral Daily Arekeva LRachana Morgan-Delores, NP   75 mg at 09/01/19 0915    enoxaparin injection 40 mg  40 mg Subcutaneous Daily Arekeva LRachana Morgan-Delores, NP   40 mg at 08/31/19 2357    labetalol injection 10 mg  10 mg Intravenous Q6H PRN Arekeva L. Mike NP        ondansetron disintegrating tablet 4 mg  4 mg Oral Q6H PRN Arekeva LRachana Morgan-PANCHITO Estrella        ondansetron injection 4 mg  4 mg Intravenous Q6H PRN Arekeva LRachana Mckeon NP        senna-docusate 8.6-50 mg per tablet 1 tablet  1 tablet Oral BID PRN Arekeva LRachana Mckeon NP        sodium chloride 0.9% flush 10 mL  10 mL Intravenous PRN Arekeva L. PANCHITO Mckeon           Past Medical/Surgical/Family History:  Medical:   Ectopic pregnancy    Surgeries:   Past Surgical History:   Procedure Laterality Date    BREAST LUMPECTOMY      EYE SURGERY        Family:   Family History   Problem Relation Age of Onset    Heart disease Mother     Cancer Mother     Heart disease Father     Heart disease Maternal Aunt     Heart disease Maternal Uncle     Heart disease Maternal Grandmother     Heart disease Maternal Grandfather     Cancer Paternal Grandmother    brother: Seizures at a child    Social History:    Substance Abuse/Dependence History:  Tobacco: denied  EtOH: none  Ilicits: denied        Current Evaluation:     Vital Signs:   Vitals:    09/01/19 0840   BP: 120/72   Pulse: 84   Resp: 15   Temp: 98.3 °F (36.8 °C)          ORIENTATION:  Alert, oriented x 4    MEMORY: recent and remote memory intact    LANGUAGE: 0=No aphasia, normal no dysarthria.    CRANIAL NERVES:   II: visual field normal, II: pupils equal, round, reactive to light and accommodation, III,IV,VI: extraocular muscles extra-ocular motions intact, V: facial light touch sensation normal bilaterally, left nasolabial fold flattening, IX: soft palate elevation normal in midline, XI: trapezius strength normal bilaterally, XI: sternocleidomastoid strength normal bilaterally    MOTOR:  Pronator drift: absent    Strength 5/5 throughout bilaterally. Deltoid, biceps, triceps, wrist flexion/extension, , Hip flexors, knee flexion/ext, dorsiflexion, plantar flexion    Tone: normal    DTR'S:  2+ bilaterally throughout  Mute plantar response  No clonus     SENSORY:  normal to joint position sense, light touch, a pin prick, proprioception and vibration bilaterally    CEREBELLAR/GAIT:  Finger to nose:normal  Heel to shin:normal  Gait: Normal    LABORATORY STUDIES:  Recent Results (from the past 24 hour(s))   POCT glucose    Collection Time: 08/31/19  2:08 PM   Result Value Ref Range    POCT Glucose 96 70 - 110 mg/dL   ISTAT CREATININE    Collection Time: 08/31/19  2:27 PM   Result Value Ref Range    POC Creatinine 0.8 0.5 - 1.4 mg/dL    Sample NAEEM    ISTAT PROCEDURE    Collection Time: 08/31/19  2:29 PM   Result Value Ref Range    POC PTWBT 14.0 9.7 - 14.3 sec    POC PTINR 1.2 0.9 - 1.2    Sample NAEEM    CBC W/ AUTO DIFFERENTIAL    Collection Time: 08/31/19  2:30 PM   Result Value Ref Range    WBC 6.47 3.90 - 12.70 K/uL    RBC 4.42 4.00 - 5.40 M/uL    Hemoglobin 13.2 12.0 - 16.0 g/dL    Hematocrit 40.3 37.0 - 48.5 %    Mean Corpuscular Volume 91 82 - 98 fL    Mean Corpuscular Hemoglobin 29.9 27.0 - 31.0 pg    Mean Corpuscular Hemoglobin Conc 32.8 32.0 - 36.0 g/dL    RDW 13.3 11.5 - 14.5 %    Platelets 350 150 - 350 K/uL    MPV 9.6 9.2 - 12.9 fL    Gran # (ANC) 3.8 1.8 - 7.7 K/uL    Lymph # 2.1 1.0  - 4.8 K/uL    Mono # 0.4 0.3 - 1.0 K/uL    Eos # 0.2 0.0 - 0.5 K/uL    Baso # 0.02 0.00 - 0.20 K/uL    Gran% 58.5 38.0 - 73.0 %    Lymph% 32.5 18.0 - 48.0 %    Mono% 5.6 4.0 - 15.0 %    Eosinophil% 3.1 0.0 - 8.0 %    Basophil% 0.3 0.0 - 1.9 %    Differential Method Automated    Comprehensive metabolic panel    Collection Time: 08/31/19  2:30 PM   Result Value Ref Range    Sodium 141 136 - 145 mmol/L    Potassium 3.7 3.5 - 5.1 mmol/L    Chloride 107 95 - 110 mmol/L    CO2 22 (L) 23 - 29 mmol/L    Glucose 97 70 - 110 mg/dL    BUN, Bld 9 6 - 20 mg/dL    Creatinine 0.9 0.5 - 1.4 mg/dL    Calcium 10.0 8.7 - 10.5 mg/dL    Total Protein 7.9 6.0 - 8.4 g/dL    Albumin 4.1 3.5 - 5.2 g/dL    Total Bilirubin 0.5 0.1 - 1.0 mg/dL    Alkaline Phosphatase 76 55 - 135 U/L    AST 20 10 - 40 U/L    ALT 15 10 - 44 U/L    Anion Gap 12 8 - 16 mmol/L    eGFR if African American >60 >60 mL/min/1.73 m^2    eGFR if non African American >60 >60 mL/min/1.73 m^2   Protime-INR    Collection Time: 08/31/19  2:30 PM   Result Value Ref Range    Prothrombin Time 11.0 9.0 - 12.5 sec    INR 1.1 0.8 - 1.2   TSH    Collection Time: 08/31/19  2:30 PM   Result Value Ref Range    TSH 0.990 0.400 - 4.000 uIU/mL   LDL - Lipid Panel    Collection Time: 08/31/19  2:30 PM   Result Value Ref Range    Cholesterol 182 120 - 199 mg/dL    Triglycerides 63 30 - 150 mg/dL    HDL 46 40 - 75 mg/dL    LDL Cholesterol 123.4 63.0 - 159.0 mg/dL    Hdl/Cholesterol Ratio 25.3 20.0 - 50.0 %    Total Cholesterol/HDL Ratio 4.0 2.0 - 5.0    Non-HDL Cholesterol 136 mg/dL   Troponin I    Collection Time: 08/31/19  2:30 PM   Result Value Ref Range    Troponin I <0.006 0.000 - 0.026 ng/mL   Hemoglobin A1c    Collection Time: 08/31/19  2:39 PM   Result Value Ref Range    Hemoglobin A1C 5.8 (H) 4.0 - 5.6 %    Estimated Avg Glucose 120 68 - 131 mg/dL   Urinalysis    Collection Time: 08/31/19  3:19 PM   Result Value Ref Range    Specimen UA Urine, Clean Catch     Color, UA Yellow Yellow,  Straw, Katy    Appearance, UA Clear Clear    pH, UA 6.0 5.0 - 8.0    Specific Gravity, UA 1.010 1.005 - 1.030    Protein, UA Negative Negative    Glucose, UA Negative Negative    Ketones, UA Negative Negative    Bilirubin (UA) Negative Negative    Occult Blood UA Trace (A) Negative    Nitrite, UA Negative Negative    Urobilinogen, UA Negative <2.0 EU/dL    Leukocytes, UA Negative Negative   Drug screen panel, emergency    Collection Time: 08/31/19  3:19 PM   Result Value Ref Range    Benzodiazepines Negative     Methadone metabolites Negative     Cocaine (Metab.) Negative     Opiate Scrn, Ur Negative     Barbiturate Screen, Ur Negative     Amphetamine Screen, Ur Negative     THC Negative     Phencyclidine Negative     Creatinine, Random Ur 53.8 15.0 - 325.0 mg/dL    Toxicology Information SEE COMMENT    Comprehensive metabolic panel    Collection Time: 09/01/19  6:12 AM   Result Value Ref Range    Sodium 141 136 - 145 mmol/L    Potassium 3.5 3.5 - 5.1 mmol/L    Chloride 108 95 - 110 mmol/L    CO2 23 23 - 29 mmol/L    Glucose 100 70 - 110 mg/dL    BUN, Bld 10 6 - 20 mg/dL    Creatinine 0.9 0.5 - 1.4 mg/dL    Calcium 9.3 8.7 - 10.5 mg/dL    Total Protein 6.8 6.0 - 8.4 g/dL    Albumin 3.6 3.5 - 5.2 g/dL    Total Bilirubin 0.8 0.1 - 1.0 mg/dL    Alkaline Phosphatase 63 55 - 135 U/L    AST 18 10 - 40 U/L    ALT 13 10 - 44 U/L    Anion Gap 10 8 - 16 mmol/L    eGFR if African American >60 >60 mL/min/1.73 m^2    eGFR if non African American >60 >60 mL/min/1.73 m^2   Magnesium    Collection Time: 09/01/19  6:12 AM   Result Value Ref Range    Magnesium 1.9 1.6 - 2.6 mg/dL   Phosphorus    Collection Time: 09/01/19  6:12 AM   Result Value Ref Range    Phosphorus 3.1 2.7 - 4.5 mg/dL   Troponin I    Collection Time: 09/01/19  6:12 AM   Result Value Ref Range    Troponin I <0.006 0.000 - 0.026 ng/mL   CK-MB    Collection Time: 09/01/19  6:12 AM   Result Value Ref Range    CPK 31 20 - 180 U/L    CPK MB 0.2 0.1 - 6.5 ng/mL    MB%  0.6 0.0 - 5.0 %   CBC auto differential    Collection Time: 09/01/19  6:12 AM   Result Value Ref Range    WBC 5.44 3.90 - 12.70 K/uL    RBC 4.00 4.00 - 5.40 M/uL    Hemoglobin 11.8 (L) 12.0 - 16.0 g/dL    Hematocrit 36.7 (L) 37.0 - 48.5 %    Mean Corpuscular Volume 92 82 - 98 fL    Mean Corpuscular Hemoglobin 29.5 27.0 - 31.0 pg    Mean Corpuscular Hemoglobin Conc 32.2 32.0 - 36.0 g/dL    RDW 13.6 11.5 - 14.5 %    Platelets 303 150 - 350 K/uL    MPV 9.5 9.2 - 12.9 fL    Gran # (ANC) 3.2 1.8 - 7.7 K/uL    Lymph # 1.9 1.0 - 4.8 K/uL    Mono # 0.3 0.3 - 1.0 K/uL    Eos # 0.1 0.0 - 0.5 K/uL    Baso # 0.01 0.00 - 0.20 K/uL    Gran% 59.2 38.0 - 73.0 %    Lymph% 34.2 18.0 - 48.0 %    Mono% 4.6 4.0 - 15.0 %    Eosinophil% 1.8 0.0 - 8.0 %    Basophil% 0.2 0.0 - 1.9 %    Differential Method Automated    APTT    Collection Time: 09/01/19  6:12 AM   Result Value Ref Range    aPTT 31.7 21.0 - 32.0 sec   Protime-INR    Collection Time: 09/01/19  6:12 AM   Result Value Ref Range    Prothrombin Time 11.8 9.0 - 12.5 sec    INR 1.1 0.8 - 1.2   Urinalysis, Reflex to Urine Culture Urine, Clean Catch    Collection Time: 09/01/19  7:00 AM   Result Value Ref Range    Specimen UA Urine, Clean Catch     Color, UA Yellow Yellow, Straw, Katy    Appearance, UA Clear Clear    pH, UA 6.0 5.0 - 8.0    Specific Gravity, UA >=1.030 (A) 1.005 - 1.030    Protein, UA Negative Negative    Glucose, UA Negative Negative    Ketones, UA 1+ (A) Negative    Bilirubin (UA) 1+ (A) Negative    Occult Blood UA Negative Negative    Nitrite, UA Negative Negative    Urobilinogen, UA Negative <2.0 EU/dL    Leukocytes, UA Negative Negative       Thyroid 0.99 wnl  HgA1C%:  5.8 pre-diabetic  Lipid panel      Chol 182, Trig 63, HDL 46  wnl    RADIOLOGY STUDIES:  I have personally reviewed the images performed.     HEAD CT: without evidence for acute intracranial hemorrhage  CTA: Negative for acute abnormality. No stenosis or LVO  MRI Brain: No acute ischemic  stroke. Bilateral basal ganglia calcifications noted, unchanged from prior CT 2/19. Suspected small DVA within the right frontal lobe.  MRA: Negative for significant stenosis, aneurysmal dilatation, or dissection.      Assessment/Plan:   39 y.o. year old right handed female with no significant PMHx  Other than 2 syncopal episodes in the past, here for intermittent and recurrent left sided numbness/tingling lasting 2-3 minutes x 6, that had resolved since yesterday around 2pm. Neurological PE unremarkable, except mild left facial nasolabial fold flattening which pt states is her baseline. Labs unremarkable, except HbA1C in pre-diabetic range. CTH/CTA unremarkable. Brain MRI negative for ischemic stroke or hemorrhage. Unchanged bilateral BG calcifications and suspected small DVA within the right frontal lobe. MRA H/N negative for stenosis, aneurysm or dissections. Currently on Aspirin 81 mg, Plavix 75 mg and atorvastatin 80 mg daily. Unclear etiology at this time but differential diagnosis includes TIA versus focal seizures. Seizures low in the differential.   Our recommendations includes:  -TTE with bubble study could be done as outpatient.  -EEG awake and asleep. Could be done as outpatient to rule out focal seizures.  -C-spine MRI with/without contrast. Could be done as outpatient to rule out demyelinating disorder.   -Neurology f/u as outpatient.  -Recommend to keep her on aspirin 81 mg daily and atorvastatin 40 mg. Plavix can be discontinued as Ischemic Stroke was ruled out.   -Pt educated on the importance of healthy lifestyle, including healthy diet, exercising, weight loss and control of BP and BG.    Differential diagnosis was explained to the patient. All questions were answered. Patient understood and agreed to adhere to plan.       Case seen and discussed with Dr. Bernardo.    Appreciate the consult.     Marianna Ivory MD.  [facility] PGY-2

## 2019-09-01 NOTE — PLAN OF CARE
[facility] Plan of Care    Consult received, patient w no PMHx stroke activated for R sided numbness w NIH2 for possible L sided facial droop and a R sided numbness, resolved. CTA negative for LVO/high grade stenosis. However per note and RN discussion, telestroke was concerned of brainstem event for the L facial droop and R sensory changes so getting stat MRI and MRA and to followup with telestroke with results.     Reccs per telestroke were to obtain MRI brain, MRA H&N. Stroke workup w TTE, A1C, lipid profile.   Place on aspirin 81mg, plavix 75mg, atorvastatin 80.    Agree w recommendations, patient stable at this time per discussion w nursing staff. Once imaging is complete at main campus, vascular neurology to be contacted to review results.     Otherwise, continue q4 neuro checks. PT/OT/ST.  Patient normotensive. Glucose within goal.     Dayanna Lyn MD  [facility]

## 2019-09-01 NOTE — PLAN OF CARE
Problem: Physical Therapy Goal  Goal: Physical Therapy Goal  Outcome: Ongoing (interventions implemented as appropriate)  Patient performing at baseline of function; instruction given on general health and well being; no further need for acute PT services; re-consult if condition changes; will d/c PT services.

## 2019-09-01 NOTE — PT/OT/SLP EVAL
"Occupational Therapy   Evaluation and Discharge Note    Name: Cyndie Tinsley  MRN: [id number]  Admitting Diagnosis:  Stroke      Recommendations:     Discharge Recommendations: home  Discharge Equipment Recommendations:  none  Barriers to discharge:  None    Assessment:     Cyndie Tinsley is a 39 y.o. female with a medical diagnosis of Stroke. At this time, patient is functioning at their prior level of function and does not require further acute OT services.     Plan:     During this hospitalization, patient does not require further acute OT services.  Please re-consult if situation changes.    · Plan of Care Reviewed with: patient    Subjective     Chief Complaint: Pt states her hand has "pins and needles" sensation, states she worries that it feels this way 2/2 sleeping positioning.  Patient/Family Comments/goals: To return to PLOF    Occupational Profile:  Living Environment: Pt lives with So in 2SH, 4STE with RHR, 12 steps to 2nd floor with BHR, tub/sh combo  Previous level of function: Indep ADLs and iADLs.  Roles and Routines: Caretaker to self and home, works as manager at [employer].  Equipment Used at home:  none  Assistance upon Discharge: Family    Pain/Comfort:  · Pain Rating 1: 7/10  · Location - Side 1: Right  · Location - Orientation 1: generalized  · Location 1: hand(reports "pins and needles")  · Pain Addressed 1: Reposition, Distraction, Cessation of Activity  · Pain Rating Post-Intervention 1: 7/10    Patients cultural, spiritual, Yazdanism conflicts given the current situation:      Objective:     Communicated with: nsm[place] prior to session.  Patient found HOB elevated with telemetry, peripheral IV upon OT entry to room.    General Precautions: Standard, fall   Orthopedic Precautions:N/A   Braces: N/A     Occupational Performance:    Bed Mobility:    Indep with Bed Mobility    Functional Mobility/Transfers:  · Patient completed Sit <> Stand Transfer with independence  with  no " assistive device   Functional Mobility: Pt with good dynamic seated and standing balance.    Activities of Daily Living:  · Upper Body Dressing: stand by assistance to don gown as robe standing EOB 2/2 PIV L antecubital   · Lower Body Dressing: independence to don B socks seated EOB    Cognitive/Visual Perceptual:  Cognitive/Psychosocial Skills:     -       Oriented to: Person, Place, Time and Situation   -       Follows Commands/attention:Follows multistep  commands  -       Communication: clear/fluent  -       Memory: No Deficits noted  -       Safety awareness/insight to disability: intact   -       Mood/Affect/Coping skills/emotional control: Appropriate to situation     Physical Exam:  Postural examination/scapula alignment:    -       No postural abnormalities identified  Skin integrity: Visible skin intact  Sensation:    -       Intact  Motor Planning:    -       WFL  Dominant hand:    -       R handed  Upper Extremity Range of Motion: BUE WFL     Upper Extremity Strength:  BUE WFL   Strength:  B hands WFL  Fine Motor Coordination:    -       Intact  Gross motor coordination:   WFL    AMPAC 6 Click ADL:  AMPAC Total Score: 24    Treatment & Education:  Pt educated on role of OT and POC.   Pt performing skills as listed above.   Pt reports that she sleeps on R side with arm tucked under her. She reports that she slept in bed in her typical fashion, states that she dit not fall asleep with anything that may have compressed axilla. No concerns for Saturday Night Palsy.  Education:    Patient left ambulating with PT in hallway with all lines intact and PT present    GOALS:   Multidisciplinary Problems     Occupational Therapy Goals     Not on file          Multidisciplinary Problems (Resolved)        Problem: Occupational Therapy Goal    Goal Priority Disciplines Outcome Interventions   Occupational Therapy Goal   (Resolved)     OT, PT/OT Outcome(s) achieved    Description:  No goals set 2/2 pt performing at  baseline.                        History:     Past Medical History:   Diagnosis Date    Edema        Past Surgical History:   Procedure Laterality Date    BREAST LUMPECTOMY      EYE SURGERY         Time Tracking:     OT Date of Treatment: 09/01/19  OT Start Time: 1022  OT Stop Time: 1037  OT Total Time (min): 15 min    Billable Minutes:Evaluation 10 Co Tx PT    Lesa Nino OT  9/1/2019

## 2019-09-01 NOTE — PLAN OF CARE
Discharge orders noted, no HH or HME ordered.    Pt's nurse will go over medications/signs and symptoms prior to discharge       09/01/19 1454   Final Note   Assessment Type Final Discharge Note   Anticipated Discharge Disposition Home   What phone number can be called within the next 1-3 days to see how you are doing after discharge? [phone]   Hospital Follow Up  Appt(s) scheduled? No  (Offices closed for Weekend. Patient to schedule own follow up appointment. )   Right Care Referral Info   Post Acute Recommendation No Care     Rosetta Lopez RN Transitional Navigator  [phone]

## 2019-09-01 NOTE — PT/OT/SLP EVAL
Speech Language Pathology Evaluation  Bedside Swallow    Patient Name:  Cyndie Tinsley   MRN:  [id number]  Admitting Diagnosis: Stroke    Recommendations:                 General Recommendations:  Follow-up not indicated  Diet recommendations:  Regular, Thin   Aspiration Precautions: 1 bite/sip at a time, Alternating bites/sips, HOB to 90 degrees, Meds whole 1 at a time, Remain upright 30 minutes post meal, Small bites/sips and Standard aspiration precautions   General Precautions: Standard, fall  Communication strategies:  none    History:     HPI: Ms. Cyndie Tinsley is a 38 y/o female who lives in [place], [place] at her residence. The patient's PCP is Dr. Morales at [facility]. The patient states she see's no other MDs. The patient states she has no pmh. The patient states she has a past surgical history of eye surgery and breast lumpectomy. The patient denies cigarette smoking, drinking alcohol, and she denies illicit drug use.      The patient presents to [facility] with complaints of numbness and tingling to the right side of her body. Per the patient she states her symptoms started this AM while she was preparing for work. Per the patient she waited a while, thinking the symptoms would subside. The patient states the symptoms did not subside, but she still attempted to go into work. On the drive to work the patient states she felt extremely lightheaded on the way to work. The patient states she then decided to come into the ED.     The ED workup revealed CO2---22, normal troponin. CXR--No detrimental change or radiographic acute intrathoracic process seen. CT of the head---Unremarkable noncontrast CT head specifically without evidence for acute intracranial hemorrhage. CTA of the head---No acute abnormality. No high-grade stenosis or major vessel occlusion. The patient will be admitted to the CDU for observation.      Past Medical History:   Diagnosis Date    Edema   "    Past Surgical History:   Procedure Laterality Date    BREAST LUMPECTOMY      EYE SURGERY       Social History: Patient lives in [place], [place] at her residence.    Chest X-Rays: Completed 8/31/19: "No detrimental change.  Monitoring leads overlie the chest.The lungs are clear, with normal appearance of pulmonary vasculature and no pleural effusion or pneumothorax. The cardiac silhouette is normal in size. The hilar and mediastinal contours are unremarkable. No acute osseous process definitively seen.  PA and lateral views can be obtained."    Prior diet: regular solids/thin liquids.    Subjective     Pt awake, alert, oriented x4, walking back from bathroom as ST entered room. Pt denies swallowing, speech, language, and cognitive deficits. No anomia, receptive language deficits, cognitive deficits, or dysarthria noted.     Pain/Comfort:  · Pain Rating 1: 0/10    Objective:     Oral Musculature Evaluation  · Oral Musculature: WNL  · Dentition: present and adequate  · Secretion Management: adequate  · Mucosal Quality: adequate  · Mandibular Strength and Mobility: WNL  · Oral Labial Strength and Mobility: WNL  · Lingual Strength and Mobility: WNL  · Buccal Strength and Mobility: WNL  · Volitional Cough: WNL  · Volitional Swallow: WNL  · Voice Prior to PO Intake: WNL    Bedside Swallow Eval:   Consistencies Assessed:  · Thin liquids, puree consistencies, regular solids     Oral Phase:   · WNL    Pharyngeal Phase:   · no overt clinical signs/symptoms of aspiration  · no overt clinical signs/symptoms of pharyngeal dysphagia    Treatment: SLP provided skilled education to pt re: rationale for BSE, role of SLP in POC, swallow study results, diet recs, and swallow precautions. Pt verbalized understanding and agreement of all information provided.    Assessment:     Cyndiealine Tinsley is a 39 y.o. female is at baseline for speech, language, cognition, and swallowing. Recommend continuing current regular solids/thin " liquids diet with adherence to standard swallow precautions. No additional ST services warranted at this time.    Goals:   Multidisciplinary Problems     SLP Goals     Not on file          Multidisciplinary Problems (Resolved)        Problem: SLP Goal    Goal Priority Disciplines Outcome   SLP Goal   (Resolved)     SLP Outcome(s) achieved   Description:  Goals:  1. Patient will successfully participate in clinical swallow evaluation and tolerate po trials with no overt s/s of aspiration.  --MET 9/1                  Plan:     · Plan of Care reviewed with:  patient   · SLP Follow-Up:  No       Discharge recommendations:  home     Time Tracking:     SLP Treatment Date:   09/01/19  Speech Start Time:  1332  Speech Stop Time:  1342     Speech Total Time (min):  10 min    Billable Minutes: Eval Swallow and Oral Function 10 minutes    Melinda Neal CCC-SLP  09/01/2019

## 2019-09-01 NOTE — NURSING
Report received & [facility] was here to transfer patient & leaving before able to assess patient.

## 2019-09-01 NOTE — PLAN OF CARE
Problem: SLP Goal  Goal: SLP Goal  Goals:  1. Patient will successfully participate in clinical swallow evaluation and tolerate po trials with no overt s/s of aspiration.  --MET 9/1  Outcome: Outcome(s) achieved Date Met: 09/01/19 9/1/2019: Swallow study completed this PM. Pt is at baseline for speech, language, cognition, and swallowing. Recommend continuing current regular solids/thin liquids diet with adherence to standard swallow precautions. No additional ST services warranted at this time.  LYNNETTE Owusu. CCC-SLP  Speech-Language Pathologist

## 2019-09-01 NOTE — DISCHARGE SUMMARY
[facility] Hospital Medicine  Discharge Summary      Patient Name: Cyndie Tinsley  MRN: [id number]  Admission Date: 8/31/2019  Hospital Length of Stay: 0 days  Discharge Date and Time:  09/01/2019 3:49 PM  Attending Physician: Maddie Nazario*   Discharging Provider: Bel Mckeon NP  Primary Care Provider: Provider Not[employer]ystem      HPI:   Ms. Cyndie Tinsley is a 38 y/o female who lives in [place], [place] at her residence. The patient's PCP is Dr. Morales at [facility]. The patient states she see's no other MDs. The patient states she has no pmh. The patient states she has a past surgical history of eye surgery and breast lumpectomy. The patient denies cigarette smoking, drinking alcohol, and she denies illicit drug use.     The patient presents to [facility] with complaints of numbness and tingling to the right side of her body. Per the patient she states her symptoms started this AM while she was preparing for work. Per the patient she waited a while, thinking the symptoms would subside. The patient states the symptoms did not subside, but she still attempted to go into work. On the drive to work the patient states she felt extremely lightheaded on the way to work. The patient states she then decided to come into the ED.    The ED workup revealed CO2---22, normal troponin. CXR--No detrimental change or radiographic acute intrathoracic process seen. CT of the head---Unremarkable noncontrast CT head specifically without evidence for acute intracranial hemorrhage. CTA of the head---No acute abnormality. No high-grade stenosis or major vessel occlusion. The patient will be admitted to the CDU for observation.     * No surgery found *      Hospital Course:   The patient was admitted to the CDU for observation of stroke. CT of the head and CTA H/N was ordered. [facility] and returned over night for STAT MRI and STAT  MRA H/N. [facility] neuro was consulted. Per [facility] neuro needed vascular neuro to review MRI. TTE with bubble study ordered--Vascular neuro states this can be done outpatient. Will order EEG and MRI c-spine outpatient. Will need to follow up with her PCP in 1 week and will need to follow up with 2 weeks.      Consults:   Consults (From admission, onward)        Status Ordering Provider     Inpatient consult to Neurology  Once     Provider:  Lupe Rosales MD    Completed TRESSA GUZMAN     Inpatient consult to Registered Dietitian/Nutritionist  Once     Provider:  (Not yet assigned)    Acknowledged TRESSA GUZMAN     Inpatient consult to Telemedicine-Stroke  Once     Provider:  (Not yet assigned)    Completed STEVAN HUSAIN     IP consult to case management/social work  Once     Provider:  (Not yet assigned)    Acknowledged TRESSA GUZMAN          * Stroke-resolved as of 9/1/2019    -evaluated by Vascular neuro  -CT of the head---Unremarkable noncontrast CT head specifically without evidence for acute intracranial hemorrhage.    -CTA of the H/N---No acute abnormality. No high-grade stenosis or major vessel occlusion.  -MRI of the head---No acute intracranial abnormality. No significant arterial abnormalities.  -MRA H/N---No acute intracranial abnormality. No significant arterial abnormalities.  -TTE with bubble study---to be done outpatient  -EEG to be done outpatient  -MRI of the C-spine---to be done outpatient  -Neuro consulted---we appreciate neuro, will need to follow up in 2 weeks with neuro  -Cont vascular neuro's recommendations: start statin 40 mg Po daily and will start ASA 81 mg Po daily.  -consulted PT/OT/SLP---no needs           Final Active Diagnoses:      Problems Resolved During this Admission:    Diagnosis Date Noted Date Resolved POA    PRINCIPAL PROBLEM:  Stroke [I63.9] 08/31/2019 09/01/2019 Yes       Discharged Condition: stable    Disposition: Home or Self  Care    Follow Up:  Follow-up Information     Dr. Morales . Schedule an appointment as soon as possible for a visit in 1 week.    Why:  Call for appointment / Offices closed for Weekend. Patient to schedule own follow up appointment.   Contact information:  [facility]           Lupe Rosales MD. Schedule an appointment as soon as possible for a visit in 2 weeks.    Specialty:  Neurology  Why:  hospital follow up / Offices closed for Weekend. Patient to schedule own follow up appointment.   Contact information:  [address]  [address]  [place] [address]  [phone]                 Patient Instructions:      MRI Cervical Spine Demyelinating W W/O Contrast   Standing Status: Future Standing Exp. Date: 09/01/20     Order Specific Question Answer Comments   Does the patient have a pacemaker or a defibrilator? No    Does the patient have a cerebral aneurysm or surgical clip, pump, nerve or brain stimulator, middle or inner ear prosthesis, or other metal implant or  been injured by a metal object(i.e. bullet, bb, shrapnel)? No    Is the patient claustrophobic? No    Will the patient require sedation? No    Does the patient have any of the following conditions? Diabetes, History of Renal Disease or Hypertension requiring medical therapy? No    May the Radiologist modify the order per protocol to meet the clinical needs of the patient? Yes    Is this part of a Research Study? No    Does the patient have on a skin patch for medication with aluminized backing? No      Diet Cardiac     Notify your health care provider if you experience any of the following:  temperature >100.4     Notify your health care provider if you experience any of the following:  persistent nausea and vomiting or diarrhea     Notify your health care provider if you experience any of the following:  severe uncontrolled pain     Notify your health care provider if you experience any of the following:  difficulty breathing or increased  cough     Notify your health care provider if you experience any of the following:  severe persistent headache     Notify your health care provider if you experience any of the following:  worsening rash     Notify your health care provider if you experience any of the following:  persistent dizziness, light-headedness, or visual disturbances     Notify your health care provider if you experience any of the following:  increased confusion or weakness     Echo Color Flow Doppler? Yes   Standing Status: Future Standing Exp. Date: 09/01/20   Order Comments: With bubble study     Order Specific Question Answer Comments   Color Flow Doppler? Yes      Ambulatory EEG   Standing Status: Future Standing Exp. Date: 09/01/20     Activity as tolerated       Significant Diagnostic Studies: Labs:   CMP   Recent Labs   Lab 08/31/19  1430 09/01/19  0612    141   K 3.7 3.5    108   CO2 22* 23   GLU 97 100   BUN 9 10   CREATININE 0.9 0.9   CALCIUM 10.0 9.3   PROT 7.9 6.8   ALBUMIN 4.1 3.6   BILITOT 0.5 0.8   ALKPHOS 76 63   AST 20 18   ALT 15 13   ANIONGAP 12 10   ESTGFRAFRICA >60 >60   EGFRNONAA >60 >60   , CBC   Recent Labs   Lab 08/31/19  1430 09/01/19  0612   WBC 6.47 5.44   HGB 13.2 11.8*   HCT 40.3 36.7*    303   , INR   Lab Results   Component Value Date    INR 1.1 09/01/2019    INR 1.1 08/31/2019    INR 1.1 09/11/2013    and Troponin   Recent Labs   Lab 09/01/19  0612   TROPONINI <0.006       Pending Diagnostic Studies:     Procedure Component Value Units Date/Time    Echo Color Flow Doppler? Yes [[id number]]     Order Status:  Sent Lab Status:  No result          Medications:  Reconciled Home Medications:      Medication List      START taking these medications    aspirin 81 MG EC tablet  Commonly known as:  ECOTRIN  Take 1 tablet (81 mg total) by mouth once daily.  Start taking on:  9/2/2019     atorvastatin 40 MG tablet  Commonly known as:  LIPITOR  Take 1 tablet (40 mg total) by mouth once daily.  Start  taking on:  9/2/2019            Indwelling Lines/Drains at time of discharge:   Lines/Drains/Airways          None          Time spent on the discharge of patient: 35 minutes  Patient was seen and examined on the date of discharge and determined to be suitable for discharge.         Bel Mckeon NP  Department of Hospital Medicine  [facility]

## 2019-09-01 NOTE — HOSPITAL COURSE
The patient was admitted to the CDU for observation of stroke. CT of the head and CTA H/N was ordered. [facility] to [facility] and returned over night for STAT MRI and STAT MRA H/N. [facility] neuro was consulted. Per [facility] neuro needed vascular neuro to review MRI. TTE with bubble study ordered--Vascular neuro states this can be done outpatient. Will order EEG and MRI c-spine outpatient. Will need to follow up with her PCP in 1 week and will need to follow up with 2 weeks.

## 2019-09-01 NOTE — PLAN OF CARE
Re:  Cyndie Tinsley, WORK / SCHOOL EXCUSE    Date: 09/01/2019            Hospital Medicine Dept.  [facility] [address] [place] [address]  [phone] after hours  [phone] fax To whom it may concern:    Ms. Cyndie Tinsley has been hospitalized at the [facility] since 8/31/2019.  Please excuse the patient from duties.  Patient may return on 9/4/2019.  No restrictions.     Please contact me if you have any questions.                    __________________________  Bel Mckeon NP

## 2019-09-01 NOTE — PLAN OF CARE
Problem: Adult Inpatient Plan of Care  Goal: Plan of Care Review  Outcome: Ongoing (interventions implemented as appropriate)  Pt received on RA.  SPO2  100%.  Pt in no apparent respiratory distress.  Will continue to monitor.

## 2019-09-01 NOTE — ASSESSMENT & PLAN NOTE
-evaluated by Vascular neuro  -CT of the head---Unremarkable noncontrast CT head specifically without evidence for acute intracranial hemorrhage.    -CTA of the H/N---No acute abnormality. No high-grade stenosis or major vessel occlusion.  -MRI of the head---No acute intracranial abnormality. No significant arterial abnormalities.  -MRA H/N---No acute intracranial abnormality. No significant arterial abnormalities.  -TTE with bubble study---to be done outpatient  -EEG to be done outpatient  -MRI of the C-spine---to be done outpatient  -Neuro consulted---we appreciate neuro, will need to follow up in 2 weeks with neuro  -Cont vascular neuro's recommendations: start statin 40 mg Po daily and will start ASA 81 mg Po daily.  -consulted PT/OT/SLP---no needs

## 2019-09-02 ENCOUNTER — HOSPITAL ENCOUNTER (EMERGENCY)
Facility: HOSPITAL | Age: 39
Discharge: HOME | End: 2019-09-02
Attending: EMERGENCY MEDICINE
Payer: MEDICAID

## 2019-09-02 VITALS
HEART RATE: 90 BPM | OXYGEN SATURATION: 99 % | DIASTOLIC BLOOD PRESSURE: 71 MMHG | SYSTOLIC BLOOD PRESSURE: 117 MMHG | WEIGHT: 220 LBS | RESPIRATION RATE: 17 BRPM | HEIGHT: 67 IN | BODY MASS INDEX: 34.53 KG/M2 | TEMPERATURE: 98 F

## 2019-09-02 DIAGNOSIS — R07.9 CHEST PAIN: ICD-10-CM

## 2019-09-02 DIAGNOSIS — M94.0 COSTOCHONDRITIS: Primary | ICD-10-CM

## 2019-09-02 LAB
B-HCG UR QL: NEGATIVE
CTP QC/QA: YES
D DIMER PPP IA.FEU-MCNC: 0.28 MG/L FEU
TROPONIN I SERPL DL<=0.01 NG/ML-MCNC: <0.006 NG/ML (ref 0–0.03)

## 2019-09-02 PROCEDURE — 84484 ASSAY OF TROPONIN QUANT: CPT

## 2019-09-02 PROCEDURE — 93005 ELECTROCARDIOGRAM TRACING: CPT

## 2019-09-02 PROCEDURE — 99285 EMERGENCY DEPT VISIT HI MDM: CPT | Mod: 25

## 2019-09-02 PROCEDURE — 25000003 PHARM REV CODE 250: Performed by: EMERGENCY MEDICINE

## 2019-09-02 PROCEDURE — 85379 FIBRIN DEGRADATION QUANT: CPT

## 2019-09-02 PROCEDURE — 81025 URINE PREGNANCY TEST: CPT | Performed by: EMERGENCY MEDICINE

## 2019-09-02 RX ORDER — CYCLOBENZAPRINE HCL 10 MG
10 TABLET ORAL
Status: COMPLETED | OUTPATIENT
Start: 2019-09-02 | End: 2019-09-02

## 2019-09-02 RX ORDER — CYCLOBENZAPRINE HCL 10 MG
10 TABLET ORAL 3 TIMES DAILY PRN
Qty: 15 TABLET | Refills: 0 | Status: SHIPPED | OUTPATIENT
Start: 2019-09-02 | End: 2019-09-07

## 2019-09-02 RX ADMIN — CYCLOBENZAPRINE HYDROCHLORIDE 10 MG: 10 TABLET, FILM COATED ORAL at 10:09

## 2019-09-02 NOTE — DISCHARGE INSTRUCTIONS
Thank you for choosing [facility] [place]! We appreciate you coming to us for your medical care. We hope you feel better soon! Please come back to [facility] for all of your future medical needs.    Our goal in the emergency department is to always give you outstanding care and exceptional service. You may receive a survey by mail or e-mail in the next week regarding your experience in our ED. We would greatly appreciate your completing and returning the survey. Your feedback provides us with a way to recognize our staff who give very good care and it helps us learn how to improve when your experience was below our aspiration of excellence.       Sincerely,    Negrito Hernandez MD  Medical Director  Emergency Department  [facility]

## 2019-09-02 NOTE — ED NOTES
Received report from KATHLEEN Moy; resuming care of pt. Pt laying in bed with family at bedside. Pt states she will like to try to provide urine specimen again; pt provided specimen cup and ambulated to bathroom.

## 2019-09-02 NOTE — ED PROVIDER NOTES
Encounter Date: 9/2/2019    SCRIBE #1 NOTE: I, Izabella Roth, am scribing for, and in the presence of,  Dr. Hernandez. I have scribed the entire note.       History     Chief Complaint   Patient presents with    Shortness of Breath     Patient states she feels like she is unable to [id number] when she lays down since yesterday.  She was discharged from hospital yesterday.  No chest pain.     This is a 39 y.o. female who has a past medical history of Edema.     The patient presents to the Emergency Department with chest pain and shortness of breath that started yesterday after she was discharged from the hospital.   Patient reports she was lying down when she began to feel chest pain and shortness of breath.   She notes pain feels like a heaviness, and she feels smothered.  Pain is worse when she lies down.  She reports taking aspirin and cholesterol medication, but found no relief.  She denies any pain in neck, back or arm, any palpitations, smoking, or history of asthma.   She notes she is often cold and has low energy, but denies depression.   Patient was discharged from hospital yesterday for symptoms of a stroke.   She had symptoms of numbness and tingling to right side of her body and face and a left facial droop.   She was not diagnosed with a stroke per se, and states her MRI was negative.   She has no other complaints at the moment.     The history is provided by the patient.     Review of patient's allergies indicates:   Allergen Reactions    Penicillins Anaphylaxis     Anaphylaxis^     Past Medical History:   Diagnosis Date    Edema      Past Surgical History:   Procedure Laterality Date    BREAST LUMPECTOMY      EYE SURGERY       Family History   Problem Relation Age of Onset    Heart disease Mother     Cancer Mother     Heart disease Father     Heart disease Maternal Aunt     Heart disease Maternal Uncle     Heart disease Maternal Grandmother     Heart disease Maternal Grandfather     Cancer Paternal  Grandmother      Social History     Tobacco Use    Smoking status: Never Smoker    Smokeless tobacco: Never Used   Substance Use Topics    Alcohol use: No    Drug use: No     Review of Systems   Constitutional: Negative for chills and fever.   HENT: Negative for rhinorrhea, sore throat and trouble swallowing.    Eyes: Negative for visual disturbance.   Respiratory: Positive for shortness of breath. Negative for cough.    Cardiovascular: Positive for chest pain.   Gastrointestinal: Negative for abdominal pain, diarrhea and vomiting.   Genitourinary: Negative for dysuria and hematuria.   Musculoskeletal: Negative for back pain and neck pain.   Skin: Negative for rash.   Neurological: Negative for numbness and headaches.   Hematological: Negative for adenopathy.       Physical Exam     Initial Vitals [09/02/19 0914]   BP Pulse Resp Temp SpO2   (!) 140/89 83 18 99.2 °F (37.3 °C) 100 %      MAP       --         Physical Exam    Nursing note and vitals reviewed.  Constitutional: She appears well-developed and well-nourished. She is not diaphoretic. No distress.   HENT:   Head: Normocephalic and atraumatic.   Mouth/Throat: Oropharynx is clear and moist.   Eyes: EOM are normal. Pupils are equal, round, and reactive to light.   Neck: Normal range of motion. Neck supple.   Cardiovascular: Normal rate, regular rhythm, normal heart sounds and intact distal pulses.   Pulmonary/Chest: Breath sounds normal. No respiratory distress. She has no wheezes. She has no rhonchi. She has no rales. She exhibits tenderness.   Reproducible chest wall tenderness, bilateral parasternal around ribs 2 and 3.    Abdominal: Soft. Bowel sounds are normal. She exhibits no distension. There is no tenderness.   Musculoskeletal: Normal range of motion. She exhibits no edema or tenderness.   No leg swelling.    Lymphadenopathy:     She has no cervical adenopathy.   Neurological: She is alert and oriented to person, place, and time. She has normal  strength.   Skin: Skin is warm and dry.   Psychiatric: She has a normal mood and affect. Thought content normal.         ED Course   Procedures  Labs Reviewed   D DIMER, QUANTITATIVE   TROPONIN I   POCT URINE PREGNANCY     EKG Readings: (Independently Interpreted)   EKG: Normal sinus rhythm at 77 bpm, nl axis, nl intervals, no hypertrophy, no ST-T changes as read by me (Dr. Hernandez).          Imaging Results          X-Ray Chest AP Portable (Final result)  Result time 09/02/19 10:32:42    Final result by Alexandria Rosales MD (09/02/19 10:32:42)                 Impression:      No acute abnormality.      Electronically signed by: Alexandria Rosales MD  Date:    09/02/2019  Time:    10:32             Narrative:    EXAMINATION:  XR CHEST AP PORTABLE    CLINICAL HISTORY:  Chest pain, unspecified    TECHNIQUE:  Single frontal view of the chest was performed.    COMPARISON:  08/31/2019    FINDINGS:  The lungs are clear, with normal appearance of pulmonary vasculature and no pleural effusion or pneumothorax.    The cardiac silhouette is normal in size. The hilar and mediastinal contours are unremarkable.    Bones are intact.                              X-Rays:   Independently Interpreted Readings:   Chest X-Ray: CXR: This is a(n) AP portable chest exam with adequate penetration, positioning and good air entry. Trachea is midline, cardiac and mediastinal silhouettes are WNL. There are no infiltrates, consolidations or effusions. Impression:  No acute process. This exam was independently interpreted by me.     Medical Decision Making:   History:   Old Medical Records: I decided to obtain old medical records.  Old Records Summarized: records from previous admission(s).       <> Summary of Records: Patient was seen yesterday for right sided numbness and weakness and left facial droop and was admitted upon evaluation. Stroke code was called. CTA head/neck, MRI/MRA head/brain/neck were all negative. Patient was discharged yesterday.    Independently Interpreted Test(s):   I have ordered and independently interpreted EKG Reading(s) - see prior notes  Clinical Tests:   Lab Tests: Ordered and Reviewed  Radiological Study: Ordered and Reviewed  Medical Tests: Ordered and Reviewed                   ED Course as of Sep 02 1642   Mon Sep 02, 2019   1028 D-Dimer: 0.28 [NP]   1028 Troponin I: <0.006 [NP]   1028 Preg Test, Ur: Negative [NP]   1028 Negative D-dimer and troponin.  Pending chest x-ray    [NP]   1045 Chest x-ray read as unremarkable.  Results, impression and plan discussed with the patient.    Patient began to tear up and ask if she was depressed because this was the same month that her mother passed away.  She does seem emotionally stressed.  I counseled the patient and reassured her on her current condition and tried to provide positive comments for her to improve her mood.     - advised counselor as outpatient  -F/U with PCP in 1-2 weeks    Pt stable for dc.    [NP]      ED Course User Index  [NP] Negrito Hernandez MD         Clinical Impression:       ICD-10-CM ICD-9-CM   1. Costochondritis M94.0 733.6   2. Chest pain R07.9 786.50       Disposition:   Disposition: Discharged  Condition: Stable      I, Dr. Negrito Hernandez, personally performed the services described in this documentation.   All medical record entries made by the scribe were at my direction and in my presence.   I have reviewed the chart and agree that the record is accurate and complete.   Negrito Hernandez MD.          Negrito Hernandez MD  09/02/19 8512

## 2019-09-02 NOTE — PT/OT/SLP EVAL
"Physical Therapy Evaluation    Patient Name:  Cyndie Tinsley   MRN:  [id number]    Recommendations:     Discharge Recommendations:  home   Discharge Equipment Recommendations: none   Barriers to discharge: None    Assessment:     Cyndie Tinsley is a 39 y.o. female admitted with a medical diagnosis of Stroke.  At this time, pt is functioning at her prior level of function and does nor required further acute PT services.    Recent Surgery: * No surgery found *      Plan:     During this hospitalization, patient does not require further acute PT services; please reconsult if condition changes.  · Plan of Care reviewed with patient    Subjective     Chief Complaint: Pt states her hand has "pins and needles" sensation, states she worries that it feels this way 2/2 sleeping positioning.  Patient/Family Comments/goals: return to PLOF; to get some rest, difficulty relaxing/sleeping, does not have any time for herself  Pain/Comfort:  · Pain Rating 1: 7/10  · Location - Side 1: Right  · Location - Orientation 1: generalized  · Location 1: hand(reports "pins and needles")  · Pain Addressed 1: Reposition, Distraction, Cessation of Activity  · Pain Rating Post-Intervention 1: 7/10    Patients cultural, spiritual, Episcopal conflicts given the current situation: no    Living Environment:  Pt lives with So in 2SH, 4STE with RHR, 12 steps to 2nd floor with BHR, tub/sh combo  Previous level of function: Indep ADLs and iADLs.  Roles and Routines: Caretaker to self and home, works as manager at [employer].  Equipment Used at home:  none  Assistance upon Discharge: Family       Objective:     Communicated with nurse prior to session.  Patient found with telemetry, peripheral IV  upon PT entry to room.    General Precautions: Standard, fall   Orthopedic Precautions:N/A   Braces: n/a    Exams:  · Cognition: A&Ox 4; follows multistep commands; safety awareness intact; speech clear/fluent  · Posture: no " abnormalities  · Sensation: Intact  · Gross/fine motor coordination: intact- heel to shin, rapid alternating tapping  · BLE ROM WFL  · BLE strength WFL~4+ to 5 grossly    Functional Mobility:  · Bed Mobility: independent  · Transfers:     · Sit to Stand:  independence with no AD  · Gait: Pt amb 300ft without LOB and mod+ challenges, no deviations, good gait mechanics  · Balance: sit~normal, stand static/dynamic good+/good; able to perform 4 stage static stand balance test - no sway with romberg eyes open/eyes closed, slight sway with modified romberg stance, SLS x 10 sec on 2nd trial-initial with self recovery, no LOB; able to withstand mod+ perturbations during amb, head turns and body turns, heel walking, toe walking  · Stairs:  Pt completed up/down 3 steps x 2 trials without using rail and no deviations in balance      Therapeutic Activities and Exercises:   educated on role of PT/POC; instructed on general health and well being- relaxation/deep breathing during times of stretch, progressive muscle relaxation- use of iphone sloan, night time routine for relaxation and sleep position to keep from compressing R hand    AM-PAC 6 CLICK MOBILITY  Total Score:  24    Patient left HOB elevated with all lines intact, call button in reach and nurse notified.    GOALS:   Multidisciplinary Problems     Physical Therapy Goals        Problem: Physical Therapy Goal    Goal Priority Disciplines Outcome Goal Variances Interventions   Physical Therapy Goal     PT, PT/OT Ongoing (interventions implemented as appropriate)                     History:     Past Medical History:   Diagnosis Date    Edema        Past Surgical History:   Procedure Laterality Date    BREAST LUMPECTOMY      EYE SURGERY         Time Tracking:     PT Received On: 09/01/19  PT Start Time: 1022     PT Stop Time: 1045  PT Total Time (min): 23 min     Billable Minutes: Evaluation 23 minutes      Giselle Kincaid, PT  09/01/2019

## 2019-09-02 NOTE — ED NOTES
Pt c/o intermittent SOB that is worse with lying and intermittent mid sternal chest heaviness since last night. Pt was recently admitted to hospital for stroke like symptoms that have completely resolved. Pt a/a/o x4. NAD noted. Family at bedside. Pt unable to provide urine specimen. Pt notified she will need UPT to get medication and xray. Will continue to monitor.

## 2019-11-07 ENCOUNTER — HOSPITAL ENCOUNTER (EMERGENCY)
Facility: HOSPITAL | Age: 39
Discharge: HOME | End: 2019-11-07
Attending: EMERGENCY MEDICINE
Payer: MEDICAID

## 2019-11-07 VITALS
DIASTOLIC BLOOD PRESSURE: 80 MMHG | RESPIRATION RATE: 20 BRPM | BODY MASS INDEX: 31.23 KG/M2 | SYSTOLIC BLOOD PRESSURE: 128 MMHG | OXYGEN SATURATION: 100 % | WEIGHT: 199 LBS | TEMPERATURE: 98 F | HEIGHT: 67 IN | HEART RATE: 80 BPM

## 2019-11-07 DIAGNOSIS — K02.9 DENTAL CARIES: Primary | ICD-10-CM

## 2019-11-07 LAB
B-HCG UR QL: NEGATIVE
CTP QC/QA: YES

## 2019-11-07 PROCEDURE — 96372 THER/PROPH/DIAG INJ SC/IM: CPT

## 2019-11-07 PROCEDURE — 63600175 PHARM REV CODE 636 W HCPCS: Performed by: EMERGENCY MEDICINE

## 2019-11-07 PROCEDURE — 99284 EMERGENCY DEPT VISIT MOD MDM: CPT | Mod: 25

## 2019-11-07 PROCEDURE — 81025 URINE PREGNANCY TEST: CPT | Performed by: EMERGENCY MEDICINE

## 2019-11-07 RX ORDER — NAPROXEN 500 MG/1
500 TABLET ORAL 2 TIMES DAILY WITH MEALS
Qty: 60 TABLET | Refills: 0 | OUTPATIENT
Start: 2019-11-07 | End: 2020-06-27

## 2019-11-07 RX ORDER — KETOROLAC TROMETHAMINE 30 MG/ML
60 INJECTION, SOLUTION INTRAMUSCULAR; INTRAVENOUS
Status: COMPLETED | OUTPATIENT
Start: 2019-11-07 | End: 2019-11-07

## 2019-11-07 RX ORDER — CLINDAMYCIN HYDROCHLORIDE 150 MG/1
300 CAPSULE ORAL 4 TIMES DAILY
Qty: 56 CAPSULE | Refills: 0 | Status: SHIPPED | OUTPATIENT
Start: 2019-11-07 | End: 2019-11-14

## 2019-11-07 RX ADMIN — KETOROLAC TROMETHAMINE 60 MG: 30 INJECTION, SOLUTION INTRAMUSCULAR at 10:11

## 2019-11-08 NOTE — ED TRIAGE NOTES
Pt presents to ED with complaints with left jaw pain. Pt states that she had the same problem in July. Pt states she was put on antibiotics and the pain resolved. Pt states she does not remember what she was diagnosed with in July. Pt states she has a follow up appointment with dental at [facility] but states she missed the appointment because she was back in this ED with chest pain. Pt states left jaw, ear and side of face hurts. Pt states she has facial swelling. Pt denies fever. Pt states that she has not taken anything OTC as she was nervous it would interact with her prescription medications. Pt states pain is 10 out of 10.     APPEARANCE: Alert, oriented and in no acute distress.  CARDIAC: Normal rate and rhythm, no murmur heard.   PERIPHERAL VASCULAR: peripheral pulses present. Normal cap refill. No edema. Warm to touch.    RESPIRATORY:Normal rate and effort, breath sounds clear bilaterally throughout chest. Respirations are equal and unlabored no obvious signs of distress.  GASTRO: soft, bowel sounds normal, no tenderness, no abdominal distention.  MUSC: Full ROM. Tenderness notes to left cheek and jaw. No obvious deformity.  SKIN: Skin is warm and dry, normal skin turgor, mucous membranes moist.  NEURO: 5/5 strength major flexors/extensors bilaterally. Sensory intact to light touch bilaterally. Brittany coma scale: eyes open spontaneously-4, oriented & converses-5, obeys commands-6. No neurological abnormalities.   MENTAL STATUS: awake, alert and aware of environment.  EYE: PERRL, both eyes: pupils brisk and reactive to light. Normal size.

## 2019-11-08 NOTE — ED PROVIDER NOTES
Encounter Date: 11/7/2019    SCRIBE #1 NOTE: I, Yessi Kilgore, am scribing for, and in the presence of,  Dr. Klein . I have scribed the entire note.     I, Dr. Larissa Klein MD, personally performed the services described in this documentation. All medical record entries made by the scribe were at my direction and in my presence.  I have reviewed the chart and agree that the record reflects my personal performance and is accurate and complete. Larissa Klein MD.    History     Chief Complaint   Patient presents with    Dental Pain     Reports L lower jaw pain/swelling X 2 months.  Patient was treated with abx but missed dental appt because she was in hospital and is unable to get another appt.     Headache     CHIEF COMPLAINT: Dental pain      HISTORY OF PRESENT ILLNESS: Cyndie Tinsley who is a 39 y.o. presents to the emergency department today with complaint of dental pain for the past 4 months. Pt notes worsening of pain to left upper and lower jaw since this morning due to her cavities. She denies any fever or any other complaints at this time. Pt reports she was hospitalized in 08/2019 which caused her to miss her dental appointment. Pt states she has been unable to reschedule.    The history is provided by the patient.     Review of patient's allergies indicates:   Allergen Reactions    Penicillins Anaphylaxis     Anaphylaxis^     Past Medical History:   Diagnosis Date    Edema      Past Surgical History:   Procedure Laterality Date    BREAST LUMPECTOMY      EYE SURGERY       Family History   Problem Relation Age of Onset    Heart disease Mother     Cancer Mother     Heart disease Father     Heart disease Maternal Aunt     Heart disease Maternal Uncle     Heart disease Maternal Grandmother     Heart disease Maternal Grandfather     Cancer Paternal Grandmother      Social History     Tobacco Use    Smoking status: Never Smoker    Smokeless tobacco: Never Used   Substance Use Topics     Alcohol use: No    Drug use: No     Review of Systems   Constitutional: Negative for activity change, appetite change, chills, diaphoresis and fever.   HENT: Positive for dental problem. Negative for congestion, drooling, ear pain, mouth sores, rhinorrhea, sinus pain, sore throat and trouble swallowing.    Eyes: Negative for pain and discharge.   Respiratory: Negative for cough, chest tightness, shortness of breath, wheezing and stridor.    Cardiovascular: Negative for chest pain, palpitations and leg swelling.   Gastrointestinal: Negative for abdominal distention, abdominal pain, blood in stool, constipation, diarrhea, nausea and vomiting.   Genitourinary: Negative for difficulty urinating, dysuria, flank pain, frequency, hematuria and urgency.   Musculoskeletal: Negative for arthralgias, back pain and myalgias.   Skin: Negative for pallor, rash and wound.   Neurological: Negative for dizziness, syncope, weakness, light-headedness and numbness.   All other systems reviewed and are negative.      Physical Exam     Initial Vitals [11/07/19 2119]   BP Pulse Resp Temp SpO2   125/78 86 20 98.8 °F (37.1 °C) 100 %      MAP       --         Physical Exam    Nursing note and vitals reviewed.  Constitutional: She appears well-developed and well-nourished. She is not diaphoretic. No distress.   HENT:   Head: Normocephalic and atraumatic.   Right Ear: External ear normal.   Left Ear: External ear normal.   Nose: Nose normal.   Multiple dental caries, no sign of abscess. No jaw swelling or facial swelling.    Eyes: Conjunctivae and EOM are normal. Pupils are equal, round, and reactive to light. Right eye exhibits no discharge. Left eye exhibits no discharge. No scleral icterus.   Neck: Normal range of motion.   Cardiovascular: Normal rate.   Peripheral perfusion appropriate   Pulmonary/Chest: Breath sounds normal. No stridor. No respiratory distress.   Musculoskeletal: Normal range of motion.   Gait normal   Neurological: She  is alert. She has normal strength. No cranial nerve deficit.   Skin: Skin is warm and dry. No rash noted. No pallor.   Psychiatric: She has a normal mood and affect.         ED Course   Procedures  Labs Reviewed   POCT URINE PREGNANCY                             This is a 38 yo female  Who presents to the ED today with multiple dental caries, She has no sign of abscess on exam. I will dc with ABX and she'll follow up with her dentist. After taking into careful account the historical factors and physical exam findings of the patient's presentation today, in conjunction with the empirical and objective data obtained on ED workup, no acute emergent medical condition requiring admission has been identified. The patient appears to be low risk for an emergent medical condition and I feel it is safe and appropriate at this time for the patient to be discharged to follow-up as detailed in their discharge instructions for reevaluation and possible continued outpatient workup and management. I have discussed the specifics of the workup with the patient and the patient has verbalized understanding of the details of the workup, the diagnosis, the treatment plan, and the need for outpatient follow-up.  Although the patient has no emergent etiology today this does not preclude the development of an emergent condition so in addition, I have advised the patient that they can return to the ED and/or activate EMS at any time with worsening of their symptoms, change of their symptoms, or with any other medical complaint.  The patient remained comfortable and stable during their visit in the ED.  Discharge and follow-up instructions discussed with the patient who expressed understanding and willingness to comply with my recommendations.     This medical record was prepared using voice dictation software. There may be phonetic errors.             Clinical Impression:       ICD-10-CM ICD-9-CM   1. Dental caries K02.9 521.00                              Larissa Klein MD  11/08/19 7314

## 2019-11-08 NOTE — DISCHARGE INSTRUCTIONS
You will need to follow up with a dentist for further evaluation of your dental pain. Call for appointment as soon as possible. Take your medications as prescribed. Return to the emergency department if you have significant swelling, difficulty breathing, difficulty swallowing or any other concerns. Please refer to the additional material provided for further information.   Dental Resources:    [facility] [phone], [phone]    [facility],   [address] [phone]    [facility],     water   [phone],   [place] [phone],   [place] [phone] ext [address] [place] [address], [address] [place] [address]  [phone]

## 2019-11-12 ENCOUNTER — HOSPITAL ENCOUNTER (EMERGENCY)
Facility: HOSPITAL | Age: 39
Discharge: HOME | End: 2019-11-13
Attending: EMERGENCY MEDICINE
Payer: MEDICAID

## 2019-11-12 DIAGNOSIS — R07.89 CHEST WALL PAIN: ICD-10-CM

## 2019-11-12 DIAGNOSIS — R07.9 CHEST PAIN: ICD-10-CM

## 2019-11-12 DIAGNOSIS — R07.89 ATYPICAL CHEST PAIN: Primary | ICD-10-CM

## 2019-11-12 PROCEDURE — 93005 ELECTROCARDIOGRAM TRACING: CPT

## 2019-11-12 PROCEDURE — 96374 THER/PROPH/DIAG INJ IV PUSH: CPT

## 2019-11-12 PROCEDURE — 81025 URINE PREGNANCY TEST: CPT | Performed by: EMERGENCY MEDICINE

## 2019-11-12 PROCEDURE — 99284 EMERGENCY DEPT VISIT MOD MDM: CPT | Mod: 25

## 2019-11-12 RX ORDER — ASPIRIN 325 MG
325 TABLET ORAL
Status: COMPLETED | OUTPATIENT
Start: 2019-11-13 | End: 2019-11-13

## 2019-11-13 VITALS
RESPIRATION RATE: 16 BRPM | TEMPERATURE: 98 F | SYSTOLIC BLOOD PRESSURE: 129 MMHG | BODY MASS INDEX: 29.82 KG/M2 | OXYGEN SATURATION: 99 % | WEIGHT: 190 LBS | HEART RATE: 99 BPM | DIASTOLIC BLOOD PRESSURE: 72 MMHG | HEIGHT: 67 IN

## 2019-11-13 LAB
ALBUMIN SERPL BCP-MCNC: 4 G/DL (ref 3.5–5.2)
ALP SERPL-CCNC: 92 U/L (ref 55–135)
ALT SERPL W/O P-5'-P-CCNC: 20 U/L (ref 10–44)
ANION GAP SERPL CALC-SCNC: 9 MMOL/L (ref 8–16)
AST SERPL-CCNC: 26 U/L (ref 10–40)
B-HCG UR QL: NEGATIVE
BASOPHILS # BLD AUTO: 0.01 K/UL (ref 0–0.2)
BASOPHILS NFR BLD: 0.1 % (ref 0–1.9)
BILIRUB SERPL-MCNC: 0.5 MG/DL (ref 0.1–1)
BNP SERPL-MCNC: <10 PG/ML (ref 0–99)
BUN SERPL-MCNC: 9 MG/DL (ref 6–20)
CALCIUM SERPL-MCNC: 9.6 MG/DL (ref 8.7–10.5)
CHLORIDE SERPL-SCNC: 105 MMOL/L (ref 95–110)
CO2 SERPL-SCNC: 26 MMOL/L (ref 23–29)
CREAT SERPL-MCNC: 0.9 MG/DL (ref 0.5–1.4)
CTP QC/QA: YES
DIFFERENTIAL METHOD: NORMAL
EOSINOPHIL # BLD AUTO: 0.2 K/UL (ref 0–0.5)
EOSINOPHIL NFR BLD: 3 % (ref 0–8)
ERYTHROCYTE [DISTWIDTH] IN BLOOD BY AUTOMATED COUNT: 13.4 % (ref 11.5–14.5)
EST. GFR  (AFRICAN AMERICAN): >60 ML/MIN/1.73 M^2
EST. GFR  (NON AFRICAN AMERICAN): >60 ML/MIN/1.73 M^2
GLUCOSE SERPL-MCNC: 106 MG/DL (ref 70–110)
HCT VFR BLD AUTO: 37.7 % (ref 37–48.5)
HGB BLD-MCNC: 12.2 G/DL (ref 12–16)
LYMPHOCYTES # BLD AUTO: 3.1 K/UL (ref 1–4.8)
LYMPHOCYTES NFR BLD: 42.7 % (ref 18–48)
MCH RBC QN AUTO: 30 PG (ref 27–31)
MCHC RBC AUTO-ENTMCNC: 32.4 G/DL (ref 32–36)
MCV RBC AUTO: 93 FL (ref 82–98)
MONOCYTES # BLD AUTO: 0.4 K/UL (ref 0.3–1)
MONOCYTES NFR BLD: 5.2 % (ref 4–15)
NEUTROPHILS # BLD AUTO: 3.6 K/UL (ref 1.8–7.7)
NEUTROPHILS NFR BLD: 49 % (ref 38–73)
PLATELET # BLD AUTO: 302 K/UL (ref 150–350)
PMV BLD AUTO: 10.2 FL (ref 9.2–12.9)
POTASSIUM SERPL-SCNC: 3.9 MMOL/L (ref 3.5–5.1)
PROT SERPL-MCNC: 6.9 G/DL (ref 6–8.4)
RBC # BLD AUTO: 4.06 M/UL (ref 4–5.4)
SODIUM SERPL-SCNC: 140 MMOL/L (ref 136–145)
TROPONIN I SERPL DL<=0.01 NG/ML-MCNC: <0.006 NG/ML (ref 0–0.03)
TROPONIN I SERPL DL<=0.01 NG/ML-MCNC: <0.006 NG/ML (ref 0–0.03)
WBC # BLD AUTO: 7.29 K/UL (ref 3.9–12.7)

## 2019-11-13 PROCEDURE — 80053 COMPREHEN METABOLIC PANEL: CPT

## 2019-11-13 PROCEDURE — 85025 COMPLETE CBC W/AUTO DIFF WBC: CPT

## 2019-11-13 PROCEDURE — 84484 ASSAY OF TROPONIN QUANT: CPT

## 2019-11-13 PROCEDURE — 25000003 PHARM REV CODE 250: Performed by: EMERGENCY MEDICINE

## 2019-11-13 PROCEDURE — 83880 ASSAY OF NATRIURETIC PEPTIDE: CPT

## 2019-11-13 PROCEDURE — 84484 ASSAY OF TROPONIN QUANT: CPT | Mod: 91

## 2019-11-13 PROCEDURE — 63600175 PHARM REV CODE 636 W HCPCS: Performed by: EMERGENCY MEDICINE

## 2019-11-13 RX ORDER — OMEPRAZOLE 20 MG/1
20 CAPSULE, DELAYED RELEASE ORAL DAILY
Qty: 14 CAPSULE | Refills: 0 | Status: SHIPPED | OUTPATIENT
Start: 2019-11-13 | End: 2023-11-27

## 2019-11-13 RX ORDER — KETOROLAC TROMETHAMINE 30 MG/ML
15 INJECTION, SOLUTION INTRAMUSCULAR; INTRAVENOUS
Status: COMPLETED | OUTPATIENT
Start: 2019-11-13 | End: 2019-11-13

## 2019-11-13 RX ADMIN — ASPIRIN 325 MG ORAL TABLET 325 MG: 325 PILL ORAL at 12:11

## 2019-11-13 RX ADMIN — KETOROLAC TROMETHAMINE 15 MG: 30 INJECTION, SOLUTION INTRAMUSCULAR at 12:11

## 2019-11-13 NOTE — ED PROVIDER NOTES
Encounter Date: 11/12/2019    SCRIBE #1 NOTE: I, Esperanza Rondon, am scribing for, and in the presence of,  Dr. Cano. I have scribed the entire note.       History     Chief Complaint   Patient presents with    Chest Pain     intermittent midsternal CP started at 3pm today, described as a heaviness 8/10. Denies SOB or N/V     Cyndie Tinsley is a 39 y.o. female who  has a past medical history of Edema.    The patient presents to the ED due to chest pain. She describes sharp pain that started around 15:00 earlier today after eating a bowl of salty gumbo. She points to her sternum as the location of pain. She denies any radiation, N/V, diaphoresis, SOB, fever, jaw/neck/back/abdominal pain, numbness/tingling, leg pain/swelling, or any other complaints. She has history of HLD.   She took a baby aspirin prior to arrival without change in symptoms.    The history is provided by the patient.     Review of patient's allergies indicates:   Allergen Reactions    Penicillins Anaphylaxis     Anaphylaxis^     Past Medical History:   Diagnosis Date    Edema      Past Surgical History:   Procedure Laterality Date    BREAST LUMPECTOMY      EYE SURGERY       Family History   Problem Relation Age of Onset    Heart disease Mother     Cancer Mother     Heart disease Father     Heart disease Maternal Aunt     Heart disease Maternal Uncle     Heart disease Maternal Grandmother     Heart disease Maternal Grandfather     Cancer Paternal Grandmother      Social History     Tobacco Use    Smoking status: Never Smoker    Smokeless tobacco: Never Used   Substance Use Topics    Alcohol use: No    Drug use: No     Review of Systems   Constitutional: Negative for chills and fever.   HENT: Negative for sore throat.    Respiratory: Negative for cough and shortness of breath.    Cardiovascular: Positive for chest pain.   Gastrointestinal: Negative for nausea and vomiting.   Genitourinary: Negative for dysuria, frequency and  urgency.   Musculoskeletal: Negative for back pain, neck pain and neck stiffness.   Skin: Negative for rash and wound.   Neurological: Negative for syncope and weakness.   Hematological: Does not bruise/bleed easily.   Psychiatric/Behavioral: Negative for agitation, behavioral problems and confusion.       Physical Exam     Initial Vitals [11/12/19 2349]   BP Pulse Resp Temp SpO2   130/75 78 (!) 22 97.7 °F (36.5 °C) 98 %      MAP       --         Physical Exam    Nursing note and vitals reviewed.  Constitutional: She appears well-developed and well-nourished. She is not diaphoretic. No distress.   HENT:   Head: Normocephalic and atraumatic.   Mouth/Throat: Oropharynx is clear and moist.   Eyes: EOM are normal. Pupils are equal, round, and reactive to light.   Neck: No tracheal deviation present.   Cardiovascular: Normal rate, regular rhythm, normal heart sounds and intact distal pulses.   Pulmonary/Chest: Effort normal and breath sounds normal. No stridor. No respiratory distress. She has no wheezes. She exhibits tenderness.   Reproducible mid sternal chest tenderness.   Abdominal: Soft. Bowel sounds are normal. She exhibits no distension and no mass. There is no tenderness.   Musculoskeletal: Normal range of motion. She exhibits no edema.   Neurological: She is alert and oriented to person, place, and time. No cranial nerve deficit or sensory deficit.   Skin: Skin is warm and dry. Capillary refill takes less than 2 seconds. No rash noted.   Psychiatric: She has a normal mood and affect. Her behavior is normal. Thought content normal.         ED Course   Procedures  Labs Reviewed   CBC W/ AUTO DIFFERENTIAL   COMPREHENSIVE METABOLIC PANEL   TROPONIN I   B-TYPE NATRIURETIC PEPTIDE   TROPONIN I   POCT URINE PREGNANCY     EKG Readings: (Independently Interpreted)   NSR, rate 75, no ST changes, no ischemia, normal intervals.  Compared with prior EKG dated 09/2019, grossly stable without significant change.      ECG  Results          EKG 12-lead (In process)  Result time 11/13/19 15:54:02    In process by Interface, Lab In [facility] (11/13/19 15:54:02)                 Narrative:    Test Reason : R07.9,    Vent. Rate : 075 BPM     Atrial Rate : 075 BPM     P-R Int : 148 ms          QRS Dur : 070 ms      QT Int : 378 ms       P-R-T Axes : 027 076 017 degrees     QTc Int : 422 ms    Normal sinus rhythm  Low voltage QRS  Borderline Abnormal ECG  When compared with ECG of 02-SEP-2019 09:42,  Nonspecific T wave abnormality has replaced inverted T waves in Inferior  leads    Referred By: [email or web address]   SELF           Confirmed By:                             X-Rays:   Independently Interpreted Readings:   Other Readings:  Reviewed by myself, read by radiology.      Imaging Results          X-Ray Chest AP Portable (Final result)  Result time 11/13/19 00:54:18    Final result by Brunilda Gutierrez MD (11/13/19 00:54:18)                 Impression:      No acute intrathoracic abnormality detected.      Electronically signed by: Brunilda Gutierrez  Date:    11/13/2019  Time:    00:54             Narrative:    EXAMINATION:  AP PORTABLE CHEST    CLINICAL HISTORY:  Chest Pain;    TECHNIQUE:  AP portable chest radiograph was submitted.    COMPARISON:  09/02/2019    FINDINGS:  AP portable chest radiograph demonstrates a cardiac silhouette within normal limits.  There is no focal consolidation, pneumothorax, or pleural effusion.  There is dextroscoliosis.                               Medical Decision Making:   History:   Old Medical Records: I decided to obtain old medical records.  Old Records Summarized: other records.       <> Summary of Records: Patient was seen in the ED in 09/2019 for sternal chest pain.  Workup including troponin and D-dimer was unremarkable.  Patient was diagnosed with costochondritis and anxiety, discharged home to follow up as outpatient.  Initial Assessment:   39-year-old female presents to ED due to sharp, substernal chest  pain starting around 3 o'clock this afternoon.  Took 81 milligram aspirin prior to arrival.  Vitals unremarkable, exam with reproducible chest tenderness, otherwise benign.  Will obtain cardiac evaluation including initial in delta troponin, basic labs, CXR, and continue to monitor.  Differential Diagnosis:   Differential Diagnosis includes, but is not limited to:  ACS/MI, PE, aortic dissection, pneumothorax, cardiac tamponade, pericarditis/myocarditis, pneumonia, infection/abscess, lung mass, trauma/fracture, costochondritis/pleurisy, MSK pain/contusion, GERD, biliary disease, pancreatitis, anemia   Clinical Tests:   Lab Tests: Ordered and Reviewed  Radiological Study: Ordered and Reviewed  Medical Tests: Ordered and Reviewed  ED Management:  EKG without ischemia or arrhythmia.  CXR clear.  Labs including initial and 3-hour delta troponin negative.  Symptoms atypical for cardiac nature.   Patient informed of findings and reassured.  Recommend PCP f/u for further evaluation and management.    After complete evaluation, including thorough history and physical exam, I do not believe the patient has ACS or any major cardiac condition at this time. The patient's symptoms are most consistent with atypical chest pain, as the patient's EKG revealed no evidence of acute ischemia, initial and 3-hour delta troponin were within acceptable limits. HEART score was calculated to be 2, low risk for cardiac origin. I feel the patient is stable for D/C from the ED with outpatient risk stratification with their PCP or Cardiology as needed.      On re-evaluation, the patient's status has improved.  After complete ED evaluation, clinical impression is most consistent with noncardiac chest pain.  PCP follow-up within 2-3 days was recommended.    After taking into careful account the patient's history, physical exam findings, as well as empirical and objective data obtained throughout ED workup, I feel no emergent medical condition has  been identified. No further evaluation or admission was felt to be required, and the patient is stable for discharge from the ED. The patient and any additional family present were updated with test results, overall clinical impression, and recommended further plan of care, including discharge instructions as provided and outpatient follow-up for continued evaluation and management as needed. All questions were answered. The patient expressed understanding and agreed with current plan for discharge and follow-up plan of care. Strict ED return precautions were provided, including return/worsening of current symptoms, new symptoms, or any other concerns.      Additional MDM:   Heart Score:    History:          Slightly suspicious.  ECG:             Normal  Age:               Less than 45 years  Risk factors: >= 3 risk factors or history of atherosclerotic disease  Troponin:       Less than or equal to normal limit  Final Score: 2                                  Clinical Impression:       ICD-10-CM ICD-9-CM   1. Atypical chest pain R07.89 786.59   2. Chest pain R07.9 786.50   3. Chest wall pain R07.89 786.52     Disposition:   Disposition: Discharged  Condition: Stable          I, Dr. Walter Cano, personally performed the services described in this documentation. All medical record entries made by the scribe were at my direction and in my presence.  I have reviewed the chart and agree that the record reflects my personal performance and is accurate and complete.     Walter Cano MD.               Walter Cano MD  11/14/19 1837

## 2019-11-13 NOTE — ED NOTES
Pt presents to the ED secondary to chest pain that began around 2pm after eating gumbo. Pt denies SOB. States pain is located to midsternum and feels more like pressure than pain.

## 2019-11-13 NOTE — ED NOTES
Pt currently in bed, respirations even and unlabored. NAD noted. States chest pain/pressure has improved. Pt updated on poc and v/u. Denies any needs at this time. Pt ambulatory to bathroom with steady gait

## 2020-06-26 ENCOUNTER — HOSPITAL ENCOUNTER (EMERGENCY)
Facility: HOSPITAL | Age: 40
Discharge: HOME | End: 2020-06-27
Attending: EMERGENCY MEDICINE
Payer: MEDICAID

## 2020-06-26 DIAGNOSIS — R07.9 CHEST PAIN: ICD-10-CM

## 2020-06-26 DIAGNOSIS — S16.1XXA STRAIN OF NECK MUSCLE, INITIAL ENCOUNTER: Primary | ICD-10-CM

## 2020-06-26 DIAGNOSIS — M79.10 MYALGIA: ICD-10-CM

## 2020-06-26 LAB
B-HCG UR QL: NEGATIVE
CTP QC/QA: YES

## 2020-06-26 PROCEDURE — 93005 ELECTROCARDIOGRAM TRACING: CPT

## 2020-06-26 PROCEDURE — 80053 COMPREHEN METABOLIC PANEL: CPT

## 2020-06-26 PROCEDURE — 85027 COMPLETE CBC AUTOMATED: CPT

## 2020-06-26 PROCEDURE — 81025 URINE PREGNANCY TEST: CPT | Performed by: EMERGENCY MEDICINE

## 2020-06-26 PROCEDURE — 99285 EMERGENCY DEPT VISIT HI MDM: CPT | Mod: 25

## 2020-06-26 PROCEDURE — 84484 ASSAY OF TROPONIN QUANT: CPT

## 2020-06-26 PROCEDURE — 96374 THER/PROPH/DIAG INJ IV PUSH: CPT

## 2020-06-27 VITALS
OXYGEN SATURATION: 100 % | SYSTOLIC BLOOD PRESSURE: 102 MMHG | TEMPERATURE: 98 F | RESPIRATION RATE: 16 BRPM | HEIGHT: 67 IN | WEIGHT: 200 LBS | HEART RATE: 77 BPM | DIASTOLIC BLOOD PRESSURE: 67 MMHG | BODY MASS INDEX: 31.39 KG/M2

## 2020-06-27 LAB
ALBUMIN SERPL BCP-MCNC: 4 G/DL (ref 3.5–5.2)
ALP SERPL-CCNC: 81 U/L (ref 55–135)
ALT SERPL W/O P-5'-P-CCNC: 25 U/L (ref 10–44)
ANION GAP SERPL CALC-SCNC: 7 MMOL/L (ref 8–16)
AST SERPL-CCNC: 25 U/L (ref 10–40)
BILIRUB SERPL-MCNC: 0.4 MG/DL (ref 0.1–1)
BUN SERPL-MCNC: 10 MG/DL (ref 6–20)
CALCIUM SERPL-MCNC: 9.3 MG/DL (ref 8.7–10.5)
CHLORIDE SERPL-SCNC: 105 MMOL/L (ref 95–110)
CO2 SERPL-SCNC: 25 MMOL/L (ref 23–29)
CREAT SERPL-MCNC: 0.9 MG/DL (ref 0.5–1.4)
ERYTHROCYTE [DISTWIDTH] IN BLOOD BY AUTOMATED COUNT: 12.1 % (ref 11.5–14.5)
EST. GFR  (AFRICAN AMERICAN): >60 ML/MIN/1.73 M^2
EST. GFR  (NON AFRICAN AMERICAN): >60 ML/MIN/1.73 M^2
GLUCOSE SERPL-MCNC: 99 MG/DL (ref 70–110)
HCT VFR BLD AUTO: 38.7 % (ref 37–48.5)
HGB BLD-MCNC: 12.4 G/DL (ref 12–16)
MCH RBC QN AUTO: 30.3 PG (ref 27–31)
MCHC RBC AUTO-ENTMCNC: 32 G/DL (ref 32–36)
MCV RBC AUTO: 95 FL (ref 82–98)
PLATELET # BLD AUTO: 305 K/UL (ref 150–350)
PMV BLD AUTO: 10.1 FL (ref 9.2–12.9)
POTASSIUM SERPL-SCNC: 4 MMOL/L (ref 3.5–5.1)
PROT SERPL-MCNC: 7.4 G/DL (ref 6–8.4)
RBC # BLD AUTO: 4.09 M/UL (ref 4–5.4)
SODIUM SERPL-SCNC: 137 MMOL/L (ref 136–145)
TROPONIN I SERPL DL<=0.01 NG/ML-MCNC: 0.01 NG/ML (ref 0–0.03)
WBC # BLD AUTO: 6.53 K/UL (ref 3.9–12.7)

## 2020-06-27 PROCEDURE — 63600175 PHARM REV CODE 636 W HCPCS: Performed by: EMERGENCY MEDICINE

## 2020-06-27 RX ORDER — METHOCARBAMOL 500 MG/1
500 TABLET, FILM COATED ORAL 3 TIMES DAILY
Qty: 15 TABLET | Refills: 0 | Status: SHIPPED | OUTPATIENT
Start: 2020-06-27 | End: 2020-07-02

## 2020-06-27 RX ORDER — NAPROXEN 500 MG/1
500 TABLET ORAL 2 TIMES DAILY WITH MEALS
Qty: 14 TABLET | Refills: 0 | Status: SHIPPED | OUTPATIENT
Start: 2020-06-27 | End: 2020-07-04

## 2020-06-27 RX ORDER — KETOROLAC TROMETHAMINE 30 MG/ML
15 INJECTION, SOLUTION INTRAMUSCULAR; INTRAVENOUS
Status: COMPLETED | OUTPATIENT
Start: 2020-06-27 | End: 2020-06-27

## 2020-06-27 RX ADMIN — KETOROLAC TROMETHAMINE 15 MG: 30 INJECTION, SOLUTION INTRAMUSCULAR at 12:06

## 2020-06-27 NOTE — ED TRIAGE NOTES
Pt. To the ER with c/o right sided neck pain with headache that radiates to the right side of chest. Pt. Denies N/V/D. Pt. States she has photophobia, but denies blurred vision. Pt. States the pain feels like a tension headache. Bed in the low position, side rails elevated x 2 and call light at the bedside.

## 2020-06-27 NOTE — ED PROVIDER NOTES
Encounter Date: 6/26/2020    SCRIBE #1 NOTE: I, Yessi Kilgore, am scribing for, and in the presence of,  Dr. Estrada. I have scribed the entire note.       History     Chief Complaint   Patient presents with    Headache     Patient presents to the ED with reports of having had a headache x 3 days. States pain worse today with reports of haivng neck pain with chest pain that radiates down and around to the axilla and the right arm. Pain treated with aleve x 2 tablets with moderate relief but states pain is returning again.     Neck Pain    Chest Pain    Arm Pain     Cyndie Tinsley is a 40 y.o. female who  has a past medical history of Edema.    The patient presents to the ED due to multiple complaints.  She states she has had an intermittent headache for the past 3 days. She describes it as a tightness in the back of her head, that radiates down the right side of her neck.  She states she was at work today and they symptoms came back. She took Aleve that helped her headache, but it returned, so she presents for evaluation.   She describes the pain as a sharp, tight pain. She states the pain radiates from head head, down the right side of her neck, into her R shoulder, down her right arm, and along her right upper back, around her R chest wall, and into her upper chest.  She denies any associated vision changes, slurred speech, facial droop, dizziness, palpitations, syncope, focal weakness, numbness, fever, cough, abdominal pain, or lower extremity involvement.  She reports a history of stroke, though on chart review she was admitted for possible TIA workup, which was unrevealing.  She denies any cardiac or lung history.        Review of patient's allergies indicates:   Allergen Reactions    Penicillins Anaphylaxis     Anaphylaxis^     Past Medical History:   Diagnosis Date    Edema      Past Surgical History:   Procedure Laterality Date    BREAST LUMPECTOMY      EYE SURGERY       Family History    Problem Relation Age of Onset    Heart disease Mother     Cancer Mother     Heart disease Father     Heart disease Maternal Aunt     Heart disease Maternal Uncle     Heart disease Maternal Grandmother     Heart disease Maternal Grandfather     Cancer Paternal Grandmother      Social History     Tobacco Use    Smoking status: Never Smoker    Smokeless tobacco: Never Used   Substance Use Topics    Alcohol use: No    Drug use: No     Review of Systems   Constitutional: Negative for chills and fever.   HENT: Negative for sore throat.    Respiratory: Negative for cough and shortness of breath.    Cardiovascular: Positive for chest pain. Negative for palpitations and leg swelling.   Gastrointestinal: Negative for nausea and vomiting.   Genitourinary: Negative for dysuria, frequency and urgency.   Musculoskeletal: Positive for myalgias. Negative for back pain.   Skin: Negative for rash and wound.   Neurological: Negative for syncope and weakness.   Hematological: Does not bruise/bleed easily.   Psychiatric/Behavioral: Negative for agitation, behavioral problems and confusion.       Physical Exam     Initial Vitals [06/26/20 2213]   BP Pulse Resp Temp SpO2   127/76 83 16 98.3 °F (36.8 °C) 99 %      MAP       --         Physical Exam    Nursing note and vitals reviewed.  Constitutional: She appears well-developed and well-nourished. She is not diaphoretic. No distress.   HENT:   Head: Normocephalic and atraumatic.   Mouth/Throat: Oropharynx is clear and moist.   Eyes: EOM are normal. Pupils are equal, round, and reactive to light.   Neck: Normal range of motion and full passive range of motion without pain. Neck supple. Muscular tenderness present. No spinous process tenderness present. No tracheal deviation present.       R-sided paracervical muscular tenderness.   Cardiovascular: Normal rate, regular rhythm, normal heart sounds and intact distal pulses.   Tenderness to R chest wall.   Pulmonary/Chest: Breath  sounds normal. No stridor. No respiratory distress. She has no wheezes.   Abdominal: Soft. Bowel sounds are normal. She exhibits no distension and no mass. There is no abdominal tenderness.   Musculoskeletal: Normal range of motion. No edema.      Cervical back: She exhibits no tenderness and no bony tenderness.      Thoracic back: She exhibits no tenderness and no bony tenderness.      Lumbar back: She exhibits no tenderness and no bony tenderness.        Back:       Comments: Reproducible muscular tenderness to R side of upper back.  No midline tenderness.   Neurological: She is alert and oriented to person, place, and time. She has normal strength. No cranial nerve deficit or sensory deficit. She exhibits normal muscle tone. Gait normal. GCS eye subscore is 4. GCS verbal subscore is 5. GCS motor subscore is 6.   Skin: Skin is warm and dry. Capillary refill takes less than 2 seconds. No pallor.   Psychiatric: She has a normal mood and affect. Her behavior is normal. Thought content normal.         ED Course   Procedures  Labs Reviewed   COMPREHENSIVE METABOLIC PANEL - Abnormal; Notable for the following components:       Result Value    Anion Gap 7 (*)     All other components within normal limits   CBC WITHOUT DIFFERENTIAL   TROPONIN I   POCT URINE PREGNANCY     EKG Readings: (Independently Interpreted)   Initial Reading: No STEMI. Previous EKG: Compared with most recent EKG Rhythm: Normal Sinus Rhythm.   Normal sinus rhythm, rate 80, no ST changes or ischemia, normal intervals.  Grossly stable from prior 11/2019.       Imaging Results          X-Ray Chest AP Portable (Final result)  Result time 06/27/20 00:18:58    Final result by Brunilda Gutierrez MD (06/27/20 00:18:58)                 Impression:      No acute intrathoracic abnormality detected.      Electronically signed by: Brunilda Gutierrez  Date:    06/27/2020  Time:    00:18             Narrative:    EXAMINATION:  AP PORTABLE CHEST    CLINICAL  HISTORY:  chest pain;    TECHNIQUE:  AP portable chest radiograph was submitted.    COMPARISON:  11/13/2019    FINDINGS:  AP portable chest radiograph demonstrates a cardiac silhouette within normal limits.  There is no focal consolidation, pneumothorax, or pleural effusion.                                 Medical Decision Making:   History:   Old Medical Records: I decided to obtain old medical records.  Old Records Summarized: other records.       <> Summary of Records: Patient seen in ED 09/2019 and 11/2019 for atypical chest pain. Workup negative.   Patient seen in ED for numbness 08/2019, CTA head/neck and MRI brain negative.  Initial Assessment:   41 yo F with HLD presents to ED with headache x 3 days, with radiation to R neck, back, chest, and arm.  Vitals reassuring, exam benign.  Peripheral pulses symmetric and intact.  Lungs clear.  No focal neuro deficits, strength, sensation intact to all extremities.  Patient well-appearing and in no acute distress.  Pain is reproducible to neck, back, shoulder, and arm.  Suspect musculoskeletal in nature.  Will obtain cardiac evaluation, basic labs, CXR, treat symptomatically, and reassess.  Differential Diagnosis:   Differential Diagnosis includes, but is not limited to:  Fracture, dislocation, compartment syndrome, nerve injury/palsy, vascular injury, rhabdomyolysis, hemarthrosis, septic joint, bursitis, muscle strain, ligament tear/sprain, laceration with foreign body, abrasion, soft tissue contusion, osteoarthritis.  ACS/MI, PE, aortic dissection, pneumothorax, cardiac tamponade, pericarditis/myocarditis, pneumonia, infection/abscess, lung mass, trauma/fracture, costochondritis/pleurisy, MSK pain/contusion, GERD, biliary disease, pancreatitis, anemia    Clinical Tests:   Lab Tests: Ordered and Reviewed  Radiological Study: Reviewed and Ordered  Medical Tests: Ordered and Reviewed  ED Management:  EKG without ischemia or arrhythmia.  CXR clear.  Labs unremarkable,  troponin negative.  Vitals have remained stable.  Acute neurologic condition or vascular abnormality highly unlikely, as pain appears reproducible with palpation.  No pulse for neuro deficits.  Will treat symptomatically with anti-inflammatory and muscle relaxer.  Recommend close follow-up with PCP for further evaluation and management.    On re-evaluation, the patient's status has improved.  After complete ED evaluation, clinical impression is most consistent with muscle strain, atypical chest pain.  PCP follow-up within 2-3 days was recommended.    After taking into careful account the patient's history, physical exam findings, as well as empirical and objective data obtained throughout ED workup, I feel no emergent medical condition has been identified. No further evaluation or admission was felt to be required, and the patient is stable for discharge from the ED. The patient and any additional family present were updated with test results, overall clinical impression, and recommended further plan of care, including discharge instructions as provided and outpatient follow-up for continued evaluation and management as needed. All questions were answered. The patient expressed understanding and agreed with current plan for discharge and follow-up plan of care. Strict ED return precautions were provided, including return/worsening of current symptoms, new symptoms, or any other concerns.                                   Clinical Impression:       ICD-10-CM ICD-9-CM   1. Strain of neck muscle, initial encounter  S16.1XXA 847.0   2. Chest pain  R07.9 786.50   3. Myalgia  M79.10 729.1                 ED Disposition Condition    Discharge Stable        ED Prescriptions     Medication Sig Dispense Start Date End Date Auth. Provider    naproxen (NAPROSYN) 500 MG tablet Take 1 tablet (500 mg total) by mouth 2 (two) times daily with meals. for 7 days 14 tablet 6/27/2020 7/4/2020 Walter Cano MD    methocarbamoL  (ROBAXIN) 500 MG Tab Take 1 tablet (500 mg total) by mouth 3 (three) times daily. for 5 days 15 tablet 6/27/2020 7/2/2020 Walter Cano MD        Follow-up Information     Follow up With Specialties Details Why Contact Info Additional Information    [facility] Schedule an appointment as soon as possible for a visit   [address], [address]  [facility] [address] [phone] At this time [facility] will only use these entries [facility], [facility] side, and Emergency Department due to COVID-19 precautions.     [facility] Schedule an appointment as soon as possible for a visit   [address]  [facility] [address] [phone] [address]nd Floor [facility], [address] At this time [facility] will only use these entries [facility], [facility] side, and Emergency Department due to COVID-19 precautions.                             I, Dr. Walter Cano, personally performed the services described in this documentation. All medical record entries made by the scribe were at my direction and in my presence.  I have reviewed the chart and agree that the record reflects my personal performance and is accurate and complete.     Walter Cano MD.           Walter Cano MD  06/28/20 [id number]

## 2020-11-12 PROCEDURE — 93005 ELECTROCARDIOGRAM TRACING: CPT

## 2020-11-12 PROCEDURE — 99285 EMERGENCY DEPT VISIT HI MDM: CPT | Mod: 25

## 2020-11-12 RX ORDER — ASPIRIN 325 MG
325 TABLET ORAL
Status: COMPLETED | OUTPATIENT
Start: 2020-11-13 | End: 2020-11-13

## 2020-11-13 ENCOUNTER — HOSPITAL ENCOUNTER (EMERGENCY)
Facility: HOSPITAL | Age: 40
Discharge: HOME | End: 2020-11-13
Attending: EMERGENCY MEDICINE
Payer: MEDICAID

## 2020-11-13 VITALS
OXYGEN SATURATION: 97 % | BODY MASS INDEX: 31.32 KG/M2 | SYSTOLIC BLOOD PRESSURE: 116 MMHG | WEIGHT: 200 LBS | DIASTOLIC BLOOD PRESSURE: 82 MMHG | TEMPERATURE: 98 F | RESPIRATION RATE: 16 BRPM | HEART RATE: 70 BPM

## 2020-11-13 DIAGNOSIS — R07.9 CHEST PAIN: ICD-10-CM

## 2020-11-13 DIAGNOSIS — R09.1 PLEURISY: Primary | ICD-10-CM

## 2020-11-13 LAB
ALBUMIN SERPL BCP-MCNC: 4.1 G/DL (ref 3.5–5.2)
ALP SERPL-CCNC: 100 U/L (ref 55–135)
ALT SERPL W/O P-5'-P-CCNC: 13 U/L (ref 10–44)
ANION GAP SERPL CALC-SCNC: 12 MMOL/L (ref 8–16)
AST SERPL-CCNC: 21 U/L (ref 10–40)
B-HCG UR QL: NEGATIVE
BASOPHILS # BLD AUTO: 0.04 K/UL (ref 0–0.2)
BASOPHILS NFR BLD: 0.5 % (ref 0–1.9)
BILIRUB SERPL-MCNC: 0.4 MG/DL (ref 0.1–1)
BNP SERPL-MCNC: <10 PG/ML (ref 0–99)
BUN SERPL-MCNC: 9 MG/DL (ref 6–20)
CALCIUM SERPL-MCNC: 9.2 MG/DL (ref 8.7–10.5)
CHLORIDE SERPL-SCNC: 106 MMOL/L (ref 95–110)
CO2 SERPL-SCNC: 23 MMOL/L (ref 23–29)
CREAT SERPL-MCNC: 1 MG/DL (ref 0.5–1.4)
CTP QC/QA: YES
DIFFERENTIAL METHOD: NORMAL
EOSINOPHIL # BLD AUTO: 0.2 K/UL (ref 0–0.5)
EOSINOPHIL NFR BLD: 3.1 % (ref 0–8)
ERYTHROCYTE [DISTWIDTH] IN BLOOD BY AUTOMATED COUNT: 13.2 % (ref 11.5–14.5)
EST. GFR  (AFRICAN AMERICAN): >60 ML/MIN/1.73 M^2
EST. GFR  (NON AFRICAN AMERICAN): >60 ML/MIN/1.73 M^2
GLUCOSE SERPL-MCNC: 97 MG/DL (ref 70–110)
HCT VFR BLD AUTO: 39.1 % (ref 37–48.5)
HGB BLD-MCNC: 12.5 G/DL (ref 12–16)
IMM GRANULOCYTES # BLD AUTO: 0.02 K/UL (ref 0–0.04)
IMM GRANULOCYTES NFR BLD AUTO: 0.3 % (ref 0–0.5)
LYMPHOCYTES # BLD AUTO: 2.7 K/UL (ref 1–4.8)
LYMPHOCYTES NFR BLD: 35.4 % (ref 18–48)
MCH RBC QN AUTO: 30.5 PG (ref 27–31)
MCHC RBC AUTO-ENTMCNC: 32 G/DL (ref 32–36)
MCV RBC AUTO: 95 FL (ref 82–98)
MONOCYTES # BLD AUTO: 0.5 K/UL (ref 0.3–1)
MONOCYTES NFR BLD: 5.8 % (ref 4–15)
NEUTROPHILS # BLD AUTO: 4.3 K/UL (ref 1.8–7.7)
NEUTROPHILS NFR BLD: 54.9 % (ref 38–73)
NRBC BLD-RTO: 0 /100 WBC
PLATELET # BLD AUTO: 333 K/UL (ref 150–350)
PMV BLD AUTO: 9.8 FL (ref 9.2–12.9)
POTASSIUM SERPL-SCNC: 3.8 MMOL/L (ref 3.5–5.1)
PROT SERPL-MCNC: 7.4 G/DL (ref 6–8.4)
RBC # BLD AUTO: 4.1 M/UL (ref 4–5.4)
SODIUM SERPL-SCNC: 141 MMOL/L (ref 136–145)
TROPONIN I SERPL DL<=0.01 NG/ML-MCNC: 0.01 NG/ML (ref 0–0.03)
TROPONIN I SERPL DL<=0.01 NG/ML-MCNC: <0.006 NG/ML (ref 0–0.03)
WBC # BLD AUTO: 7.74 K/UL (ref 3.9–12.7)

## 2020-11-13 PROCEDURE — 85025 COMPLETE CBC W/AUTO DIFF WBC: CPT

## 2020-11-13 PROCEDURE — 84484 ASSAY OF TROPONIN QUANT: CPT

## 2020-11-13 PROCEDURE — 25000003 PHARM REV CODE 250: Performed by: EMERGENCY MEDICINE

## 2020-11-13 PROCEDURE — 84484 ASSAY OF TROPONIN QUANT: CPT | Mod: 91

## 2020-11-13 PROCEDURE — 25000003 PHARM REV CODE 250: Performed by: NURSE PRACTITIONER

## 2020-11-13 PROCEDURE — 80053 COMPREHEN METABOLIC PANEL: CPT

## 2020-11-13 PROCEDURE — 83880 ASSAY OF NATRIURETIC PEPTIDE: CPT

## 2020-11-13 PROCEDURE — 81025 URINE PREGNANCY TEST: CPT | Performed by: EMERGENCY MEDICINE

## 2020-11-13 RX ORDER — LIDOCAINE 50 MG/G
1 PATCH TOPICAL DAILY
Qty: 30 PATCH | Refills: 0 | Status: SHIPPED | OUTPATIENT
Start: 2020-11-13 | End: 2022-08-13 | Stop reason: CLARIF

## 2020-11-13 RX ORDER — IBUPROFEN 800 MG/1
800 TABLET ORAL EVERY 6 HOURS PRN
Qty: 20 TABLET | Refills: 0 | Status: SHIPPED | OUTPATIENT
Start: 2020-11-13 | End: 2022-08-13 | Stop reason: CLARIF

## 2020-11-13 RX ORDER — LIDOCAINE 50 MG/G
1 PATCH TOPICAL
Status: DISCONTINUED | OUTPATIENT
Start: 2020-11-13 | End: 2020-11-13 | Stop reason: HOSPADM

## 2020-11-13 RX ADMIN — LIDOCAINE 1 PATCH: 50 PATCH TOPICAL at 02:11

## 2020-11-13 RX ADMIN — ASPIRIN 325 MG ORAL TABLET 325 MG: 325 PILL ORAL at 12:11

## 2020-11-13 NOTE — Clinical Note
"Cyndie Gaminojose Tinsley was seen and treated in our emergency department on 11/12/2020.  She may return to work on 11/14/2020.       If you have any questions or concerns, please don't hesitate to call.      Chuck Estrada MD"

## 2020-11-13 NOTE — ED NOTES
"Patient states an "achy" pain is just located to left side of ribs right under arm. Pain rated 4/10. Nurse updated her on all resulted labs.   "

## 2020-11-13 NOTE — ED TRIAGE NOTES
Patient presents to the ED with complaints of left sided chest pain that radiates under armpit x 2 days. She has had this pain in past. States pain is felt worse with lying down. Patients vitals are stable. She denies being SOB or having a cardiac history.    Review of patient's allergies indicates:   Allergen Reactions    Penicillins Anaphylaxis     Anaphylaxis^        Patient has verified the spelling of their name and  on armband.   APPEARANCE: Patient is alert, calm, oriented x 4, and does not appear distressed.  SKIN: Skin is normal for race, warm, and dry. Normal skin turgor and mucous membranes moist.  CARDIAC: Normal rate and rhythm, no murmur heard. +left sided chest pain that radiates underneath armpit  RESPIRATORY:Normal rate and effort. Breath sounds clear bilaterally throughout chest. Respirations are equal and unlabored.    GASTRO: Bowel sounds normal, abdomen is soft, no tenderness, and no abdominal distention.  MUSCLE: Full ROM. No bony tenderness or soft tissue tenderness. No obvious deformity.

## 2020-11-13 NOTE — ED PROVIDER NOTES
Encounter Date: 11/12/2020    SCRIBE #1 NOTE: I, Saige Colon, am scribing for, and in the presence of,  Dr. Estrada. I have scribed the entire note.       History     Chief Complaint   Patient presents with    Chest Pain     Left sided chest pain that radiates underneath arm x 2 days. Patient has had this pain in the past. Patient reports normal labs and scans. States pain just keeps coming back. Pain felt more with lying down. Denies SOB       Time seen by provider: 2:00 AM on 11/13/2020    The patient is a 40 y.o. female who presents to the ED with complaint of chest pain which onset 2 days ago. Patient reports while lying down the pain presented itself to the left side along with tingling in her left hand, and notes she has felt this way in the past. She notes the last time she felt this way she had stroke like symptoms, was admitted, and discharged home with all work ups being negative. Patient denies any strenuous activity. Symptoms are worsening in severity. She notes her pain worsens when lying down. Patient denies any fever, chills, shortness of breath, nausea, vomiting, leg swelling, palpitations, and all other sxs at this time. No prior Tx included. Patient denies any long plane or car rides. She does notes she had some nasal congestion recently, however it has gone away after taking over the counter medicine.     has a past medical history of Edema.  has a past surgical history that includes Eye surgery and Breast lumpectomy.    The history is provided by the patient.     Review of patient's allergies indicates:   Allergen Reactions    Penicillins Anaphylaxis     Anaphylaxis^     Past Medical History:   Diagnosis Date    Edema      Past Surgical History:   Procedure Laterality Date    BREAST LUMPECTOMY      EYE SURGERY       Family History   Problem Relation Age of Onset    Heart disease Mother     Cancer Mother     Heart disease Father     Heart disease Maternal Aunt     Heart disease  Maternal Uncle     Heart disease Maternal Grandmother     Heart disease Maternal Grandfather     Cancer Paternal Grandmother      Social History     Tobacco Use    Smoking status: Never Smoker    Smokeless tobacco: Never Used   Substance Use Topics    Alcohol use: No    Drug use: No     Review of Systems   Constitutional: Negative for chills and fever.   HENT: Negative for ear pain and sore throat.    Eyes: Negative for redness.   Respiratory: Negative for shortness of breath.    Cardiovascular: Positive for chest pain. Negative for leg swelling.   Gastrointestinal: Negative for abdominal pain, diarrhea and vomiting.   Genitourinary: Negative for dysuria.   Musculoskeletal: Negative for back pain.   Skin: Negative for rash.   Neurological: Negative for weakness, numbness and headaches.       Physical Exam     Initial Vitals [11/12/20 2337]   BP Pulse Resp Temp SpO2   121/73 76 18 98.3 °F (36.8 °C) 100 %      MAP       --         Physical Exam    Nursing note and vitals reviewed.  Constitutional: She appears well-developed and well-nourished. She is not diaphoretic. No distress.   HENT:   Head: Normocephalic and atraumatic.   Eyes: Conjunctivae and EOM are normal.   Neck: Normal range of motion. Neck supple.   Cardiovascular: Normal rate, regular rhythm and normal heart sounds.   Pulmonary/Chest: Breath sounds normal. No respiratory distress.   Abdominal: Soft. There is no abdominal tenderness.   Musculoskeletal: Normal range of motion. No tenderness or edema.   Neurological: She is alert and oriented to person, place, and time. She has normal strength.   Skin: Skin is warm and dry. Capillary refill takes less than 2 seconds.   Psychiatric: She has a normal mood and affect. Her behavior is normal. Judgment and thought content normal.         ED Course   Procedures  Labs Reviewed   CBC W/ AUTO DIFFERENTIAL   COMPREHENSIVE METABOLIC PANEL   TROPONIN I   B-TYPE NATRIURETIC PEPTIDE   TROPONIN I   POCT URINE  PREGNANCY     EKG Readings: (Independently Interpreted)   Initial Reading: No STEMI. Rhythm: Normal Sinus Rhythm.       Imaging Results          X-Ray Chest AP Portable (Final result)  Result time 11/13/20 01:39:27    Final result by Danielle Tirado MD (11/13/20 01:39:27)                 Impression:      Negative chest.      Electronically signed by: Danielle Tirado  Date:    11/13/2020  Time:    01:39             Narrative:    EXAMINATION:  XR CHEST AP PORTABLE    CLINICAL HISTORY:  Chest pain, unspecified    TECHNIQUE:  Single frontal view of the chest was performed.    COMPARISON:  06/26/2020    FINDINGS:  Cardiac silhouette is not enlarged appears lungs appear clear.  There is no effusion or pneumothorax.  Osseous structures appear intact.                                 Medical Decision Making:   History:   Old Medical Records: I decided to obtain old medical records.  Initial Assessment:   40-year-old female presents the ER for evaluation of chest pain.  Onset 2 days, left-sided.  No fever chills.  Denies recent illness.  Negative leg swelling.  Reports reproducible left-sided chest pain which concern to come to the ER.  Concern for cardiac event.  Arrived in the ER, no acute distress, vitals stable, reproducible left-sided chest tenderness noted.  Patient is PERC negative.  Differential includes NSTEMI, ACS, pneumonia, muscle strain, pleurisy, costochondritis versus other cause.  Will obtain blood work troponin x-ray symptomatic control reassess.  Independently Interpreted Test(s):   I have ordered and independently interpreted EKG Reading(s) - see prior notes  Clinical Tests:   Lab Tests: Ordered and Reviewed  Radiological Study: Ordered and Reviewed  Medical Tests: Ordered and Reviewed    Additional MDM:   PERC Rule:   Age is greater than or equal to 50 = 0.0  Heart Rate is greater than or equal to 100 = 0.0  SaO2 on room air < 95% = 0.0  Unilateral leg swelling = 0.0  Hemoptysis = 0.0  Recent surgery  or trauma = 0.0  Prior PE or DVT =  0.0  Hormone use = 0.00  PERC Score = 0                ED Course as of Nov 13 0518 Fri Nov 13, 2020   0357 Resting comfortably in bed, no acute distress.  Repeat troponin within normal limits, no elevation.  Discussed with patient diagnosis of pleurisy discussed plan discharge home Motrin Lidoderm patch return precautions work note.  Patient understood agreed plan patient will be discharged.    [SE]      ED Course User Index  [SE] Chuck Estrada MD            Clinical Impression:       ICD-10-CM ICD-9-CM   1. Pleurisy  R09.1 511.0   2. Chest pain  R07.9 786.50                      Disposition:   Disposition: Discharged  Condition: Stable     ED Disposition Condition    Discharge Stable        ED Prescriptions     Medication Sig Dispense Start Date End Date Auth. Provider    ibuprofen (ADVIL,MOTRIN) 800 MG tablet Take 1 tablet (800 mg total) by mouth every 6 (six) hours as needed. 20 tablet 11/13/2020  Chuck Estrada MD    lidocaine (LIDODERM) 5 % Place 1 patch onto the skin once daily. Remove & Discard patch within 12 hours or as directed by MD 30 patch 11/13/2020  Chuck Estrada MD        Follow-up Information     Follow up With Specialties Details Why Contact Info Additional Information    [facility] Schedule an appointment as soon as possible for a visit  As needed [address], [address]  [place] [address] [phone] At this time [facility] will only use these entries [facility], [facility], and Emergency Department due to COVID-19 precautions.                         My Scribe Attestation: I acknowledge that the documentation on this chart was provided by described on the date of service noted above and that the documentation in the chart accurately reflects work and decisions made by me alone.               Chuck Estrada MD  11/13/20 0577

## 2020-11-13 NOTE — ED NOTES
Pt reports pain has improved after aspirin administration. Pt states she only has the pain while lying down.

## 2020-11-13 NOTE — FIRST PROVIDER EVALUATION
Emergency Department TeleTriage Encounter Note      CHIEF COMPLAINT    Chief Complaint   Patient presents with    Chest Pain     Left sided chest pain that radiates underneath arm x 2 days. Patient has had this pain in the past. Patient reports normal labs and scans. States pain just keeps coming back. Pain felt more with lying down. Denies SOB       VITAL SIGNS   Initial Vitals [11/12/20 2337]   BP Pulse Resp Temp SpO2   121/73 76 18 98.3 °F (36.8 °C) 100 %      MAP       --            ALLERGIES    Review of patient's allergies indicates:   Allergen Reactions    Penicillins Anaphylaxis     Anaphylaxis^       PROVIDER TRIAGE NOTE  39 y/o female which presents with left sided chest pain that radiates to her left arm. Patient only feels pain when she is laying down. Denies N/V or diaphoresis.       ORDERS  Labs Reviewed   CBC W/ AUTO DIFFERENTIAL       ED Orders (720h ago, onward)    Start Ordered     Status Ordering Provider    11/13/20 0246 11/12/20 2345  Troponin I #2  Once Timed      Ordered TYRELLMIKAYLA    11/13/20 0000 11/12/20 2345  aspirin tablet 325 mg  ED 1 Time      Ordered TYRELLMIKAYLA    11/12/20 2346 11/12/20 2345  Vital signs  Every 15 min      Ordered TYRELLMIKAYLA    11/12/20 2346 11/12/20 2345  Cardiac Monitoring - Adult  Continuous     Comments: Notify Physician If:    Ordered TYRELLMIKAYLA    11/12/20 2346 11/12/20 2345  Pulse Oximetry Continuous  Continuous      Ordered TYRELLMIKAYLA    11/12/20 2346 11/12/20 2345  Diet NPO  Diet effective now      Ordered TYRELLMIKAYLA    11/12/20 2346 11/12/20 2345  Saline lock IV  Once      Ordered TYRELLMIKAYLA    11/12/20 2346 11/12/20 2345  CBC auto differential  STAT      Ordered TYRELLMIKAYLA    11/12/20 2346 11/12/20 2345  Comprehensive metabolic panel  STAT      Ordered TYRELLMIKAYLA    11/12/20 2346 11/12/20 2345  Troponin I #1  STAT      Ordered TYRELLMIKAYLA    11/12/20 2346 11/12/20 2345  BNP   STAT      Ordered MIKAYLA SANTILLAN    11/12/20 2345 11/12/20 2344  EKG 12-lead  Once  Completed    Completed by MANUEL FERNANDES on 11/12/2020 at 11:44 PM JIN LEBLANC            Virtual Visit Note: The provider triage portion of this emergency department evaluation and documentation was performed via [employer], a HIPAA-compliant telemedicine application, in concert with a tele-presenter in the room. A face to face patient evaluation with one of my colleagues will occur once the patient is placed in an emergency department room.      DISCLAIMER: This note was prepared with [employer] voice recognition transcription software. Garbled syntax, mangled pronouns, and other bizarre constructions may be attributed to that software system.

## 2020-11-13 NOTE — ED NOTES
Patient updated on lab and scan resulting times. Patient states pain is just about the same as when first arriving to ER. Aspirin given. Will reassess pain in 30 min.

## 2021-12-09 ENCOUNTER — NURSE TRIAGE (OUTPATIENT)
Dept: ADMINISTRATIVE | Facility: CLINIC | Age: 41
End: 2021-12-09
Payer: MEDICAID

## 2022-10-08 ENCOUNTER — HOSPITAL ENCOUNTER (EMERGENCY)
Facility: HOSPITAL | Age: 42
Discharge: HOME | End: 2022-10-09
Attending: EMERGENCY MEDICINE
Payer: MEDICAID

## 2022-10-08 DIAGNOSIS — R55 NEAR SYNCOPE: Primary | ICD-10-CM

## 2022-10-08 DIAGNOSIS — R07.9 CHEST PAIN: ICD-10-CM

## 2022-10-08 LAB
ALBUMIN SERPL BCP-MCNC: 3.5 G/DL (ref 3.5–5.2)
ALP SERPL-CCNC: 63 U/L (ref 55–135)
ALT SERPL W/O P-5'-P-CCNC: 12 U/L (ref 10–44)
ANION GAP SERPL CALC-SCNC: 10 MMOL/L (ref 8–16)
AST SERPL-CCNC: 18 U/L (ref 10–40)
BASOPHILS # BLD AUTO: 0.05 K/UL (ref 0–0.2)
BASOPHILS NFR BLD: 0.9 % (ref 0–1.9)
BILIRUB SERPL-MCNC: 0.2 MG/DL (ref 0.1–1)
BNP SERPL-MCNC: <10 PG/ML (ref 0–99)
BUN SERPL-MCNC: 8 MG/DL (ref 6–20)
CALCIUM SERPL-MCNC: 9.3 MG/DL (ref 8.7–10.5)
CHLORIDE SERPL-SCNC: 108 MMOL/L (ref 95–110)
CO2 SERPL-SCNC: 22 MMOL/L (ref 23–29)
CREAT SERPL-MCNC: 0.9 MG/DL (ref 0.5–1.4)
D DIMER PPP IA.FEU-MCNC: 0.63 MG/L FEU
DIFFERENTIAL METHOD: ABNORMAL
EOSINOPHIL # BLD AUTO: 0.2 K/UL (ref 0–0.5)
EOSINOPHIL NFR BLD: 3.5 % (ref 0–8)
ERYTHROCYTE [DISTWIDTH] IN BLOOD BY AUTOMATED COUNT: 13.6 % (ref 11.5–14.5)
EST. GFR  (NO RACE VARIABLE): >60 ML/MIN/1.73 M^2
GLUCOSE SERPL-MCNC: 98 MG/DL (ref 70–110)
HCT VFR BLD AUTO: 36.6 % (ref 37–48.5)
HGB BLD-MCNC: 11.7 G/DL (ref 12–16)
IMM GRANULOCYTES # BLD AUTO: 0.01 K/UL (ref 0–0.04)
IMM GRANULOCYTES NFR BLD AUTO: 0.2 % (ref 0–0.5)
LYMPHOCYTES # BLD AUTO: 1.9 K/UL (ref 1–4.8)
LYMPHOCYTES NFR BLD: 34.4 % (ref 18–48)
MCH RBC QN AUTO: 29.8 PG (ref 27–31)
MCHC RBC AUTO-ENTMCNC: 32 G/DL (ref 32–36)
MCV RBC AUTO: 93 FL (ref 82–98)
MONOCYTES # BLD AUTO: 0.4 K/UL (ref 0.3–1)
MONOCYTES NFR BLD: 6.8 % (ref 4–15)
NEUTROPHILS # BLD AUTO: 3 K/UL (ref 1.8–7.7)
NEUTROPHILS NFR BLD: 54.2 % (ref 38–73)
NRBC BLD-RTO: 0 /100 WBC
PLATELET # BLD AUTO: 306 K/UL (ref 150–450)
PMV BLD AUTO: 10.5 FL (ref 9.2–12.9)
POTASSIUM SERPL-SCNC: 4.3 MMOL/L (ref 3.5–5.1)
PROT SERPL-MCNC: 7.1 G/DL (ref 6–8.4)
RBC # BLD AUTO: 3.92 M/UL (ref 4–5.4)
SODIUM SERPL-SCNC: 140 MMOL/L (ref 136–145)
TROPONIN I SERPL DL<=0.01 NG/ML-MCNC: 0.03 NG/ML (ref 0–0.03)
TROPONIN I SERPL DL<=0.01 NG/ML-MCNC: <0.006 NG/ML (ref 0–0.03)
WBC # BLD AUTO: 5.46 K/UL (ref 3.9–12.7)

## 2022-10-08 PROCEDURE — 85025 COMPLETE CBC W/AUTO DIFF WBC: CPT | Performed by: EMERGENCY MEDICINE

## 2022-10-08 PROCEDURE — 93010 ELECTROCARDIOGRAM REPORT: CPT | Mod: ,,, | Performed by: INTERNAL MEDICINE

## 2022-10-08 PROCEDURE — 93010 EKG 12-LEAD: ICD-10-PCS | Mod: ,,, | Performed by: INTERNAL MEDICINE

## 2022-10-08 PROCEDURE — 25000003 PHARM REV CODE 250: Performed by: EMERGENCY MEDICINE

## 2022-10-08 PROCEDURE — 83880 ASSAY OF NATRIURETIC PEPTIDE: CPT | Performed by: EMERGENCY MEDICINE

## 2022-10-08 PROCEDURE — 93005 ELECTROCARDIOGRAM TRACING: CPT

## 2022-10-08 PROCEDURE — 85379 FIBRIN DEGRADATION QUANT: CPT | Performed by: EMERGENCY MEDICINE

## 2022-10-08 PROCEDURE — 99285 EMERGENCY DEPT VISIT HI MDM: CPT | Mod: 25

## 2022-10-08 PROCEDURE — 25500020 PHARM REV CODE 255: Performed by: EMERGENCY MEDICINE

## 2022-10-08 PROCEDURE — 84484 ASSAY OF TROPONIN QUANT: CPT | Mod: 91 | Performed by: EMERGENCY MEDICINE

## 2022-10-08 PROCEDURE — 80053 COMPREHEN METABOLIC PANEL: CPT | Performed by: EMERGENCY MEDICINE

## 2022-10-08 RX ORDER — ASPIRIN 325 MG
325 TABLET ORAL
Status: COMPLETED | OUTPATIENT
Start: 2022-10-08 | End: 2022-10-08

## 2022-10-08 RX ADMIN — IOHEXOL 100 ML: 350 INJECTION, SOLUTION INTRAVENOUS at 09:10

## 2022-10-08 RX ADMIN — ASPIRIN 325 MG ORAL TABLET 325 MG: 325 PILL ORAL at 05:10

## 2022-10-08 NOTE — Clinical Note
"Cyndie Gaminojose Tinsley was seen and treated in our emergency department on 10/8/2022.  She may return to work on 10/12/2022.       If you have any questions or concerns, please don't hesitate to call.      Sasha Blair RN RN    "

## 2022-10-08 NOTE — ED PROVIDER NOTES
"Encounter Date: 10/8/2022    SCRIBE #1 NOTE: I, Ridge Bullock, am scribing for, and in the presence of,  Roseline Ellington.     History     Chief Complaint   Patient presents with    Chest Pain     C/o chest pain, dizziness, "feeling like I was seeing stars about to pass out", and weakness while "walking around [employer] and bent over to  a bar of soap", pt has a holter monitor on for past episodes of syncope, pt starting to have improvement in symptoms but chest burning still continues     Cyndie Tinsley is a 42 y.o. female who  has a past medical history of Edema.    The patient presents to the ED due to chest pain.   Patient reports that prior to arrival she was in the store when she bent over to pick something up. When she came back upright and started walking she began to feel chest pain, lightheadedness, dizziness, and fatigue. Notes her vision was dimmed. The chest pain and fatigue only lasted a couple of seconds but the dizziness and lightheadedness were persistent. She sat in her car and was unable to relieve her symptoms. Patient decided she was unfit to drive home and came across the street to the ED. She states that she has been dealing with similar symptoms for a few months. She saw a cardiologist at the [facility] who placed a Holter monitor on her chest wall to detect any arrhythmias during these episodes.      The history is provided by the patient.   Review of patient's allergies indicates:   Allergen Reactions    Penicillins Anaphylaxis     Anaphylaxis^     Past Medical History:   Diagnosis Date    Edema      Past Surgical History:   Procedure Laterality Date    BREAST LUMPECTOMY      EYE SURGERY       Family History   Problem Relation Age of Onset    Heart disease Mother     Cancer Mother     Heart disease Father     Heart disease Maternal Aunt     Heart disease Maternal Uncle     Heart disease Maternal Grandmother     Heart disease Maternal Grandfather     Cancer Paternal " Grandmother      Social History     Tobacco Use    Smoking status: Never    Smokeless tobacco: Never   Substance Use Topics    Alcohol use: No    Drug use: No     Review of Systems   Constitutional:  Positive for fatigue. Negative for fever.   HENT:  Negative for sore throat.    Respiratory:  Negative for shortness of breath.    Cardiovascular:  Positive for chest pain.   Gastrointestinal:  Negative for nausea.   Genitourinary:  Negative for dysuria.   Musculoskeletal:  Negative for back pain.   Skin:  Negative for rash.   Neurological:  Positive for dizziness and light-headedness. Negative for weakness.   Hematological:  Does not bruise/bleed easily.     Physical Exam     Initial Vitals [10/08/22 1624]   BP Pulse Resp Temp SpO2   (!) 145/80 84 18 98.1 °F (36.7 °C) 100 %      MAP       --         Physical Exam    Nursing note and vitals reviewed.  Constitutional: She appears well-developed and well-nourished.   HENT:   Head: Normocephalic and atraumatic.   Mouth/Throat: Oropharynx is clear and moist.   Eyes: Conjunctivae and EOM are normal. Pupils are equal, round, and reactive to light.   Neck: Neck supple.   Normal range of motion.  Cardiovascular:  Normal rate, regular rhythm, normal heart sounds and intact distal pulses.     Exam reveals no gallop and no friction rub.       No murmur heard.  Pulmonary/Chest: Breath sounds normal.   Abdominal: Abdomen is soft. Bowel sounds are normal. She exhibits no distension. There is no abdominal tenderness. There is no rebound and no guarding.   Musculoskeletal:         General: No tenderness or edema. Normal range of motion.      Cervical back: Normal range of motion and neck supple.     Lymphadenopathy:     She has no cervical adenopathy.   Neurological: She is alert and oriented to person, place, and time. She has normal strength and normal reflexes.   Skin: Skin is warm and dry.   Psychiatric: She has a normal mood and affect. Her behavior is normal. Judgment and thought  content normal.       ED Course   Procedures  Labs Reviewed   CBC W/ AUTO DIFFERENTIAL - Abnormal; Notable for the following components:       Result Value    RBC 3.92 (*)     Hemoglobin 11.7 (*)     Hematocrit 36.6 (*)     All other components within normal limits   D DIMER, QUANTITATIVE - Abnormal; Notable for the following components:    D-Dimer 0.63 (*)     All other components within normal limits   COMPREHENSIVE METABOLIC PANEL - Abnormal; Notable for the following components:    CO2 22 (*)     All other components within normal limits   TROPONIN I   TROPONIN I   B-TYPE NATRIURETIC PEPTIDE   TROPONIN I   TROPONIN I     EKG Readings: (Independently Interpreted)   Initial: 1617. Rhythm: Normal Sinus Rhythm. Heart Rate: 83. Ectopy: No Ectopy. Conduction: Normal. ST Segments: Normal ST Segments. T Waves: Normal. Clinical Impression: Normal Sinus Rhythm     Imaging Results              CTA Chest Non-Coronary (PE Studies) (Final result)  Result time 10/08/22 23:04:26      Final result by David Meza MD (10/08/22 23:04:26)                   Impression:      No evidence of central pulmonary embolism.  Distal pulmonary emboli would difficult to exclude given overall poor bolus timing.  Suggest correlation with DVT study and additional clinical parameters.    Otherwise, no acute intrathoracic process.      Electronically signed by: David Meza MD  Date:    10/08/2022  Time:    23:04               Narrative:    EXAMINATION:  CTA CHEST NON CORONARY (PE STUDIES)    CLINICAL HISTORY:  Pulmonary embolism (PE) suspected, positive D-dimer;    TECHNIQUE:  Low dose axial images, sagittal and coronal reformations were obtained from the thoracic inlet to the lung bases following the IV administration of 100 mL of Omnipaque 350.  Contrast timing was optimized to evaluate the pulmonary arteries.  MIP images were performed.    COMPARISON:  Chest x-ray dated 10/08/2022.    FINDINGS:  CT pulmonary embolism examination:    There are  no filling defects within the central pulmonary tree.  The segmental and subsegmental pulmonary tree are poorly delineated due to poor bolus timing.    CT chest:    The base of the neck is within normal limits.  The supraclavicular regions are unremarkable.  The thyroid gland is unremarkable.    The trachea is unremarkable.  The central airways are within normal limits.  There is no evidence of bronchiectasis.  No endobronchial lesions identified.    The heart is unremarkable.  There are no pericardial effusions.  There is no evidence of intracardiac thrombus.    The thoracic aorta is normal in caliber.  There is no intimal flap to suggest a dissection.  The great vessels arising for aortic arch are within normal limits.    There is no evidence of lymphadenopathy within the mediastinum or hilar regions.  The axillary regions are unremarkable.    There are no pleural effusions.  There is no evidence of a pneumothorax.  There is no evidence of pneumomediastinum.  No airspace opacity is present.  No pulmonary nodules identified.    The esophagus is unremarkable.  The visualized upper abdominal structures are within normal limits.  There is no evidence of free air in the upper abdomen.    The chest wall is unremarkable.  The osseous structures are unremarkable.                                       X-Ray Chest AP Portable (Final result)  Result time 10/08/22 18:33:20      Final result by David Meza MD (10/08/22 18:33:20)                   Impression:      No acute process.      Electronically signed by: David Meza MD  Date:    10/08/2022  Time:    18:33               Narrative:    EXAMINATION:  XR CHEST AP PORTABLE    CLINICAL HISTORY:  Chest Pain;    TECHNIQUE:  Single frontal view of the chest was performed.    COMPARISON:  08/13/2022    FINDINGS:  Monitoring EKG leads are present.  The trachea is unremarkable.  The cardiomediastinal silhouette is within normal limits.  The hilar structures are unremarkable.   There is no evidence of free air beneath the hemidiaphragms.  There are no pleural effusions.  There is no evidence of a pneumothorax.  There is no evidence of pneumomediastinum.  No airspace opacity is present.  The osseous structures are unremarkable.                                       Medications   aspirin tablet 325 mg (325 mg Oral Given 10/8/22 1749)   iohexoL (OMNIPAQUE 350) injection 100 mL (100 mLs Intravenous Given 10/8/22 2103)                              Clinical Impression:   Final diagnoses:  [R07.9] Chest pain  [R55] Near syncope (Primary)        ED Disposition Condition    Discharge Stable          ED Prescriptions    None       Follow-up Information    None         I, Roseline Ellington, personally performed the services described in this documentation. All medical record entries made by the scribe were at my direction and in my presence.  I have reviewed the chart and agree that the record reflects my personal performance and is accurate and complete. Roseline Ellington M.D. 11:32 PM10/09/2022      Roseline Ellington MD  10/09/22 0104

## 2022-10-09 VITALS
DIASTOLIC BLOOD PRESSURE: 92 MMHG | OXYGEN SATURATION: 100 % | RESPIRATION RATE: 17 BRPM | BODY MASS INDEX: 34.37 KG/M2 | SYSTOLIC BLOOD PRESSURE: 138 MMHG | TEMPERATURE: 99 F | WEIGHT: 219 LBS | HEIGHT: 67 IN | HEART RATE: 82 BPM

## 2022-10-09 LAB — TROPONIN I SERPL DL<=0.01 NG/ML-MCNC: <0.006 NG/ML (ref 0–0.03)

## 2023-08-19 PROBLEM — G43.001 MIGRAINE WITHOUT AURA AND WITH STATUS MIGRAINOSUS, NOT INTRACTABLE: Status: ACTIVE | Noted: 2023-08-19

## 2023-08-19 PROBLEM — M62.838 NECK MUSCLE SPASM: Status: ACTIVE | Noted: 2023-08-19
